# Patient Record
Sex: FEMALE | Race: WHITE | Employment: FULL TIME | URBAN - METROPOLITAN AREA
[De-identification: names, ages, dates, MRNs, and addresses within clinical notes are randomized per-mention and may not be internally consistent; named-entity substitution may affect disease eponyms.]

---

## 2017-01-10 ENCOUNTER — APPOINTMENT (OUTPATIENT)
Dept: LAB | Age: 47
End: 2017-01-10
Attending: FAMILY MEDICINE
Payer: COMMERCIAL

## 2017-01-10 DIAGNOSIS — R73.09 ELEVATED GLUCOSE: ICD-10-CM

## 2017-01-10 LAB — GLUCOSE SERPL-MCNC: 135 MG/DL (ref 70–99)

## 2017-01-10 PROCEDURE — 82947 ASSAY GLUCOSE BLOOD QUANT: CPT

## 2017-01-10 PROCEDURE — 36415 COLL VENOUS BLD VENIPUNCTURE: CPT

## 2017-01-14 ENCOUNTER — OFFICE VISIT (OUTPATIENT)
Dept: OBGYN CLINIC | Facility: CLINIC | Age: 47
End: 2017-01-14

## 2017-01-14 VITALS — SYSTOLIC BLOOD PRESSURE: 111 MMHG | DIASTOLIC BLOOD PRESSURE: 77 MMHG

## 2017-01-14 DIAGNOSIS — N91.1 SECONDARY AMENORRHEA: ICD-10-CM

## 2017-01-14 DIAGNOSIS — R10.2 PELVIC PAIN: ICD-10-CM

## 2017-01-14 DIAGNOSIS — D25.9 UTERINE LEIOMYOMA, UNSPECIFIED LOCATION: Primary | ICD-10-CM

## 2017-01-14 LAB
CONTROL LINE PRESENT WITH A CLEAR BACKGROUND (YES/NO): YES YES/NO
KIT LOT #: NORMAL NUMERIC
PREGNANCY TEST, URINE: NEGATIVE

## 2017-01-14 PROCEDURE — 81025 URINE PREGNANCY TEST: CPT | Performed by: OBSTETRICS & GYNECOLOGY

## 2017-01-14 PROCEDURE — 99244 OFF/OP CNSLTJ NEW/EST MOD 40: CPT | Performed by: OBSTETRICS & GYNECOLOGY

## 2017-01-14 PROCEDURE — 96372 THER/PROPH/DIAG INJ SC/IM: CPT | Performed by: OBSTETRICS & GYNECOLOGY

## 2017-01-14 RX ORDER — AZELASTINE HYDROCHLORIDE 0.5 MG/ML
SOLUTION/ DROPS OPHTHALMIC
Refills: 3 | COMMUNITY
Start: 2017-01-01 | End: 2017-07-22

## 2017-01-14 NOTE — PROGRESS NOTES
HPI:   Horacio Ellison is a 42year old female who presents for a consult for hx of large fundal fibroid and pelvic pain, s/p lupron tx for 6 months. Pt stated her pelvic pain has completely resolved and she feels her abd is smaller now.   Pt also with No past surgical history on file.    Family History   Problem Relation Age of Onset   • Diabetes Mother    • Other [Other] [OTHER] Father      good health   • Cancer Neg    • High Blood Pressure Mother       Social History:     Smoking Status: Never Sm possibly 2 more months. Pt counseled extensively, in exam room/counseled/seen for approx 25-30 min. Well woman cousneling. . Self breast exam explained.  Health maintenancePt' s weight is There is no weight on file to calculate BMI., recommended low fat

## 2017-01-19 ENCOUNTER — TELEPHONE (OUTPATIENT)
Dept: FAMILY MEDICINE CLINIC | Facility: CLINIC | Age: 47
End: 2017-01-19

## 2017-01-19 DIAGNOSIS — R73.01 IMPAIRED FASTING BLOOD SUGAR: Primary | ICD-10-CM

## 2017-01-19 NOTE — TELEPHONE ENCOUNTER
Notes Recorded by Diana Stanley DO on 1/16/2017 at 8:24 PM  Repeat blood sugar was much higher.  Have her do hemoglobin A1c diagnosis impaired fasting blood sugar and follow-up for an appointment next week.  Begin diabetic diet.  And 2 which can for

## 2017-02-22 ENCOUNTER — HOSPITAL ENCOUNTER (OUTPATIENT)
Dept: ULTRASOUND IMAGING | Facility: HOSPITAL | Age: 47
Discharge: HOME | End: 2017-02-22
Attending: OBSTETRICS & GYNECOLOGY
Payer: COMMERCIAL

## 2017-02-22 DIAGNOSIS — D25.9 UTERINE LEIOMYOMA, UNSPECIFIED LOCATION: ICD-10-CM

## 2017-02-22 PROCEDURE — 76830 TRANSVAGINAL US NON-OB: CPT

## 2017-02-22 PROCEDURE — 76856 US EXAM PELVIC COMPLETE: CPT

## 2017-02-23 NOTE — TELEPHONE ENCOUNTER
Noted. Please send letter to patient  Give her her blood tests (glucose ) from January and make sure she has an order for the additional blood tests ordered. She may f/u with me 1 day after her blood tests for the results and tx.

## 2017-02-23 NOTE — TELEPHONE ENCOUNTER
Spoke to pt's , he is requesting lab results from January. Advised him that he is not listed to release sensitive health care information. He became upset, states that he has the right to get information about his wife.  Advised him to call back when

## 2017-03-09 ENCOUNTER — LAB ENCOUNTER (OUTPATIENT)
Dept: LAB | Age: 47
End: 2017-03-09
Attending: FAMILY MEDICINE
Payer: COMMERCIAL

## 2017-03-09 DIAGNOSIS — R73.01 IMPAIRED FASTING GLUCOSE: Primary | ICD-10-CM

## 2017-03-09 LAB — HBA1C MFR BLD: 6.7 % (ref 4–6)

## 2017-03-09 PROCEDURE — 36415 COLL VENOUS BLD VENIPUNCTURE: CPT

## 2017-03-09 PROCEDURE — 83036 HEMOGLOBIN GLYCOSYLATED A1C: CPT

## 2017-03-17 ENCOUNTER — TELEPHONE (OUTPATIENT)
Dept: FAMILY MEDICINE CLINIC | Facility: CLINIC | Age: 47
End: 2017-03-17

## 2017-03-17 NOTE — TELEPHONE ENCOUNTER
----- Message from Melissa Ortega DO sent at 3/16/2017  5:59 PM CDT -----  Blood sugar was high .   follow-up to discuss please have her come in fasting

## 2017-03-18 ENCOUNTER — HOSPITAL ENCOUNTER (OUTPATIENT)
Dept: MAMMOGRAPHY | Age: 47
Discharge: HOME | End: 2017-03-18
Attending: FAMILY MEDICINE
Payer: COMMERCIAL

## 2017-03-18 DIAGNOSIS — Z12.31 VISIT FOR SCREENING MAMMOGRAM: ICD-10-CM

## 2017-03-18 PROCEDURE — 77067 SCR MAMMO BI INCL CAD: CPT

## 2017-04-01 ENCOUNTER — OFFICE VISIT (OUTPATIENT)
Dept: FAMILY MEDICINE CLINIC | Facility: CLINIC | Age: 47
End: 2017-04-01

## 2017-04-01 VITALS
WEIGHT: 150.19 LBS | TEMPERATURE: 98 F | SYSTOLIC BLOOD PRESSURE: 114 MMHG | DIASTOLIC BLOOD PRESSURE: 80 MMHG | BODY MASS INDEX: 27 KG/M2 | HEART RATE: 70 BPM

## 2017-04-01 DIAGNOSIS — E11.9 DIABETES MELLITUS, NEW ONSET (HCC): Primary | ICD-10-CM

## 2017-04-01 PROCEDURE — 99212 OFFICE O/P EST SF 10 MIN: CPT | Performed by: FAMILY MEDICINE

## 2017-04-01 PROCEDURE — 99214 OFFICE O/P EST MOD 30 MIN: CPT | Performed by: FAMILY MEDICINE

## 2017-04-01 RX ORDER — METFORMIN HYDROCHLORIDE 500 MG/1
500 TABLET, EXTENDED RELEASE ORAL
Qty: 90 TABLET | Refills: 3 | Status: SHIPPED | OUTPATIENT
Start: 2017-04-01 | End: 2018-03-05

## 2017-04-01 RX ORDER — ASPIRIN 81 MG/1
81 TABLET ORAL DAILY
Qty: 90 TABLET | Refills: 3 | Status: SHIPPED | OUTPATIENT
Start: 2017-04-01 | End: 2018-02-02

## 2017-04-01 NOTE — PROGRESS NOTES
New onset dm  Patient's hemoglobin A1c was 6.7 last fasting blood sugar was 135.   Patient is new onset diabetes mellitus  No exam performed today      Assessment diabetes mellitus new onset  We discussed the pathogenesis of disease we discussed and organ d

## 2017-04-20 RX ORDER — BLOOD SUGAR DIAGNOSTIC
STRIP MISCELLANEOUS
Qty: 50 STRIP | Refills: 3 | Status: SHIPPED | OUTPATIENT
Start: 2017-04-20 | End: 2017-04-29

## 2017-04-24 ENCOUNTER — APPOINTMENT (OUTPATIENT)
Dept: LAB | Age: 47
End: 2017-04-24
Attending: FAMILY MEDICINE
Payer: COMMERCIAL

## 2017-04-24 DIAGNOSIS — E11.9 DIABETES MELLITUS, NEW ONSET (HCC): ICD-10-CM

## 2017-04-24 PROCEDURE — 80053 COMPREHEN METABOLIC PANEL: CPT

## 2017-04-24 PROCEDURE — 36415 COLL VENOUS BLD VENIPUNCTURE: CPT

## 2017-04-24 PROCEDURE — 82043 UR ALBUMIN QUANTITATIVE: CPT

## 2017-04-24 PROCEDURE — 82570 ASSAY OF URINE CREATININE: CPT

## 2017-04-24 PROCEDURE — 80061 LIPID PANEL: CPT

## 2017-04-29 ENCOUNTER — OFFICE VISIT (OUTPATIENT)
Dept: FAMILY MEDICINE CLINIC | Facility: CLINIC | Age: 47
End: 2017-04-29

## 2017-04-29 VITALS
HEART RATE: 67 BPM | SYSTOLIC BLOOD PRESSURE: 104 MMHG | BODY MASS INDEX: 25.89 KG/M2 | HEIGHT: 64 IN | DIASTOLIC BLOOD PRESSURE: 65 MMHG | WEIGHT: 151.63 LBS

## 2017-04-29 DIAGNOSIS — E11.9 TYPE 2 DIABETES MELLITUS WITHOUT COMPLICATION, WITHOUT LONG-TERM CURRENT USE OF INSULIN (HCC): Primary | ICD-10-CM

## 2017-04-29 PROCEDURE — 99212 OFFICE O/P EST SF 10 MIN: CPT | Performed by: FAMILY MEDICINE

## 2017-04-29 PROCEDURE — 99214 OFFICE O/P EST MOD 30 MIN: CPT | Performed by: FAMILY MEDICINE

## 2017-04-29 RX ORDER — ROSUVASTATIN CALCIUM 5 MG/1
5 TABLET, COATED ORAL NIGHTLY
Qty: 90 TABLET | Refills: 3 | Status: SHIPPED | OUTPATIENT
Start: 2017-04-29 | End: 2018-04-01

## 2017-04-29 RX ORDER — LISINOPRIL 5 MG/1
5 TABLET ORAL DAILY
Qty: 90 TABLET | Refills: 3 | Status: SHIPPED | OUTPATIENT
Start: 2017-04-29 | End: 2018-04-01

## 2017-04-29 NOTE — PROGRESS NOTES
Has been checking her sugars  [address] was 125  [address] was 120  Usually 100 0r 105 in am.    No hypoglycemic episodes    Has been watching classes on youtube. Diabetic Visit    Patient denies problems with their medication.   Denies ulcers, chest pa

## 2017-06-29 ENCOUNTER — HOSPITAL ENCOUNTER (OUTPATIENT)
Dept: ENDOCRINOLOGY | Facility: HOSPITAL | Age: 47
Discharge: HOME | End: 2017-06-29
Attending: FAMILY MEDICINE
Payer: COMMERCIAL

## 2017-06-29 VITALS — WEIGHT: 151.19 LBS | BODY MASS INDEX: 26 KG/M2

## 2017-06-29 DIAGNOSIS — E11.9 TYPE 2 DIABETES MELLITUS WITHOUT COMPLICATION, WITHOUT LONG-TERM CURRENT USE OF INSULIN (HCC): Primary | ICD-10-CM

## 2017-06-29 NOTE — PROGRESS NOTES
[address]  Visit Note     Ashleysarthak Cadenaoctavio   :3/6/1970      Patient attended diabetes education appointment. Education provided in [address] [place] [address].      Assessment:    Blood sugars and monitoring: checking 2-3 times

## 2017-07-15 ENCOUNTER — APPOINTMENT (OUTPATIENT)
Dept: LAB | Age: 47
End: 2017-07-15
Attending: FAMILY MEDICINE
Payer: COMMERCIAL

## 2017-07-15 DIAGNOSIS — E11.9 TYPE 2 DIABETES MELLITUS WITHOUT COMPLICATION, WITHOUT LONG-TERM CURRENT USE OF INSULIN (HCC): ICD-10-CM

## 2017-07-15 LAB
ALBUMIN SERPL BCP-MCNC: 4 G/DL (ref 3.5–4.8)
ALBUMIN/GLOB SERPL: 1.5 {RATIO} (ref 1–2)
ALP SERPL-CCNC: 62 U/L (ref 32–100)
ALT SERPL-CCNC: 18 U/L (ref 14–54)
ANION GAP SERPL CALC-SCNC: 8 MMOL/L (ref 0–18)
AST SERPL-CCNC: 17 U/L (ref 15–41)
BILIRUB SERPL-MCNC: 1.1 MG/DL (ref 0.3–1.2)
BUN SERPL-MCNC: 13 MG/DL (ref 8–20)
BUN/CREAT SERPL: 18.6 (ref 10–20)
CALCIUM SERPL-MCNC: 9 MG/DL (ref 8.5–10.5)
CHLORIDE SERPL-SCNC: 104 MMOL/L (ref 95–110)
CHOLEST SERPL-MCNC: 126 MG/DL (ref 110–200)
CO2 SERPL-SCNC: 25 MMOL/L (ref 22–32)
CREAT SERPL-MCNC: 0.7 MG/DL (ref 0.5–1.5)
CREAT UR-MCNC: 88.2 MG/DL
GLOBULIN PLAS-MCNC: 2.7 G/DL (ref 2.5–3.7)
GLUCOSE SERPL-MCNC: 101 MG/DL (ref 70–99)
HDLC SERPL-MCNC: 29 MG/DL
LDLC SERPL CALC-MCNC: 51 MG/DL (ref 0–99)
MICROALBUMIN UR-MCNC: 0.3 MG/DL (ref 0–1.8)
MICROALBUMIN/CREAT UR: 3.4 MG/G{CREAT} (ref 0–20)
NONHDLC SERPL-MCNC: 97 MG/DL
OSMOLALITY UR CALC.SUM OF ELEC: 284 MOSM/KG (ref 275–295)
POTASSIUM SERPL-SCNC: 4 MMOL/L (ref 3.3–5.1)
PROT SERPL-MCNC: 6.7 G/DL (ref 5.9–8.4)
SODIUM SERPL-SCNC: 137 MMOL/L (ref 136–144)
TRIGL SERPL-MCNC: 228 MG/DL (ref 1–149)

## 2017-07-15 PROCEDURE — 36415 COLL VENOUS BLD VENIPUNCTURE: CPT

## 2017-07-15 PROCEDURE — 80053 COMPREHEN METABOLIC PANEL: CPT

## 2017-07-15 PROCEDURE — 82043 UR ALBUMIN QUANTITATIVE: CPT

## 2017-07-15 PROCEDURE — 80061 LIPID PANEL: CPT

## 2017-07-15 PROCEDURE — 82570 ASSAY OF URINE CREATININE: CPT

## 2017-07-15 PROCEDURE — 83036 HEMOGLOBIN GLYCOSYLATED A1C: CPT

## 2017-07-16 LAB — HBA1C MFR BLD: 6.7 % (ref 4–6)

## 2017-07-20 ENCOUNTER — HOSPITAL ENCOUNTER (OUTPATIENT)
Dept: ENDOCRINOLOGY | Facility: HOSPITAL | Age: 47
Discharge: HOME | End: 2017-07-20
Attending: FAMILY MEDICINE
Payer: COMMERCIAL

## 2017-07-20 DIAGNOSIS — E11.9 TYPE 2 DIABETES MELLITUS WITHOUT COMPLICATION, WITHOUT LONG-TERM CURRENT USE OF INSULIN (HCC): Primary | ICD-10-CM

## 2017-07-20 NOTE — PROGRESS NOTES
[address]  Visit Note     [place]   :3/6/1970    Time started: 8:25 AM   Time ended:9:25      Patient attended follow-up diabetes education appointment. Education provided in [address] [place] [address]

## 2017-07-22 ENCOUNTER — OFFICE VISIT (OUTPATIENT)
Dept: FAMILY MEDICINE CLINIC | Facility: CLINIC | Age: 47
End: 2017-07-22

## 2017-07-22 VITALS
BODY MASS INDEX: 26 KG/M2 | TEMPERATURE: 98 F | WEIGHT: 150 LBS | HEART RATE: 65 BPM | SYSTOLIC BLOOD PRESSURE: 100 MMHG | DIASTOLIC BLOOD PRESSURE: 67 MMHG

## 2017-07-22 DIAGNOSIS — E11.9 TYPE 2 DIABETES MELLITUS WITHOUT COMPLICATION, WITHOUT LONG-TERM CURRENT USE OF INSULIN (HCC): Primary | ICD-10-CM

## 2017-07-22 PROCEDURE — 99214 OFFICE O/P EST MOD 30 MIN: CPT | Performed by: FAMILY MEDICINE

## 2017-07-22 PROCEDURE — 90471 IMMUNIZATION ADMIN: CPT | Performed by: FAMILY MEDICINE

## 2017-07-22 PROCEDURE — 90670 PCV13 VACCINE IM: CPT | Performed by: FAMILY MEDICINE

## 2017-07-22 PROCEDURE — 99212 OFFICE O/P EST SF 10 MIN: CPT | Performed by: FAMILY MEDICINE

## 2017-07-22 NOTE — PROGRESS NOTES
Diabetic Visit    Patient denies problems with their medication. Denies ulcers, chest pain, dyspnea on exertion. Ht rrr no M no S3 S4  Lung clear no wheeze  abd soft nontender  Carotids without bruit  Ext normal monofilament exam  No ulcers on feet.

## 2017-08-10 ENCOUNTER — APPOINTMENT (OUTPATIENT)
Dept: ENDOCRINOLOGY | Facility: HOSPITAL | Age: 47
End: 2017-08-10
Attending: FAMILY MEDICINE
Payer: COMMERCIAL

## 2017-10-26 RX ORDER — BLOOD SUGAR DIAGNOSTIC
STRIP MISCELLANEOUS
Qty: 100 STRIP | Refills: 3 | Status: SHIPPED | OUTPATIENT
Start: 2017-10-26 | End: 2019-08-04

## 2017-11-03 RX ORDER — LANCETS
EACH MISCELLANEOUS
Qty: 200 EACH | Refills: 3 | Status: SHIPPED | OUTPATIENT
Start: 2017-11-03 | End: 2019-08-23

## 2018-01-23 ENCOUNTER — OFFICE VISIT (OUTPATIENT)
Dept: FAMILY MEDICINE CLINIC | Facility: CLINIC | Age: 48
End: 2018-01-23

## 2018-01-23 VITALS
WEIGHT: 153 LBS | HEART RATE: 79 BPM | DIASTOLIC BLOOD PRESSURE: 68 MMHG | TEMPERATURE: 98 F | BODY MASS INDEX: 26 KG/M2 | SYSTOLIC BLOOD PRESSURE: 106 MMHG

## 2018-01-23 DIAGNOSIS — E11.9 TYPE 2 DIABETES MELLITUS WITHOUT COMPLICATION, WITHOUT LONG-TERM CURRENT USE OF INSULIN (HCC): Primary | ICD-10-CM

## 2018-01-23 DIAGNOSIS — D21.9 LEIOMYOMA: ICD-10-CM

## 2018-01-23 DIAGNOSIS — N94.6 PAINFUL MENSTRUATION: ICD-10-CM

## 2018-01-23 PROCEDURE — 99212 OFFICE O/P EST SF 10 MIN: CPT | Performed by: FAMILY MEDICINE

## 2018-01-23 PROCEDURE — 99214 OFFICE O/P EST MOD 30 MIN: CPT | Performed by: FAMILY MEDICINE

## 2018-01-23 NOTE — PROGRESS NOTES
Has painful periods  Didn't get any better with the Treatment. leiomyoma was large 1 year ago   Refer to Dr Bernadine Roca uses her medication  Good control    Patient's past medical surgical family social history was reviewed.     Review of Systems (TRANSABDOMINAL AND TRANSVAGINAL) (CPT=[id number]);  Future

## 2018-01-29 ENCOUNTER — APPOINTMENT (OUTPATIENT)
Dept: LAB | Age: 48
End: 2018-01-29
Attending: FAMILY MEDICINE
Payer: COMMERCIAL

## 2018-01-29 DIAGNOSIS — E11.9 TYPE 2 DIABETES MELLITUS WITHOUT COMPLICATION, WITHOUT LONG-TERM CURRENT USE OF INSULIN (HCC): ICD-10-CM

## 2018-01-29 LAB
ALBUMIN SERPL BCP-MCNC: 3.9 G/DL (ref 3.5–4.8)
ALBUMIN/GLOB SERPL: 1.3 {RATIO} (ref 1–2)
ALP SERPL-CCNC: 71 U/L (ref 32–100)
ALT SERPL-CCNC: 20 U/L (ref 14–54)
ANION GAP SERPL CALC-SCNC: 12 MMOL/L (ref 0–18)
AST SERPL-CCNC: 20 U/L (ref 15–41)
BILIRUB SERPL-MCNC: 1.8 MG/DL (ref 0.3–1.2)
BUN SERPL-MCNC: 10 MG/DL (ref 8–20)
BUN/CREAT SERPL: 12.8 (ref 10–20)
CALCIUM SERPL-MCNC: 9.1 MG/DL (ref 8.5–10.5)
CHLORIDE SERPL-SCNC: 101 MMOL/L (ref 95–110)
CHOLEST SERPL-MCNC: 121 MG/DL (ref 110–200)
CO2 SERPL-SCNC: 24 MMOL/L (ref 22–32)
CREAT SERPL-MCNC: 0.78 MG/DL (ref 0.5–1.5)
CREAT UR-MCNC: 144.6 MG/DL
GLOBULIN PLAS-MCNC: 3.1 G/DL (ref 2.5–3.7)
GLUCOSE SERPL-MCNC: 119 MG/DL (ref 70–99)
HBA1C MFR BLD: 7 % (ref 4–6)
HDLC SERPL-MCNC: 37 MG/DL
LDLC SERPL CALC-MCNC: 63 MG/DL (ref 0–99)
MICROALBUMIN UR-MCNC: 0.6 MG/DL (ref 0–1.8)
MICROALBUMIN/CREAT UR: 4.1 MG/G{CREAT} (ref 0–20)
NONHDLC SERPL-MCNC: 84 MG/DL
OSMOLALITY UR CALC.SUM OF ELEC: 284 MOSM/KG (ref 275–295)
POTASSIUM SERPL-SCNC: 3.8 MMOL/L (ref 3.3–5.1)
PROT SERPL-MCNC: 7 G/DL (ref 5.9–8.4)
SODIUM SERPL-SCNC: 137 MMOL/L (ref 136–144)
TRIGL SERPL-MCNC: 105 MG/DL (ref 1–149)

## 2018-01-29 PROCEDURE — 82570 ASSAY OF URINE CREATININE: CPT

## 2018-01-29 PROCEDURE — 36415 COLL VENOUS BLD VENIPUNCTURE: CPT

## 2018-01-29 PROCEDURE — 80053 COMPREHEN METABOLIC PANEL: CPT

## 2018-01-29 PROCEDURE — 82043 UR ALBUMIN QUANTITATIVE: CPT

## 2018-01-29 PROCEDURE — 83036 HEMOGLOBIN GLYCOSYLATED A1C: CPT

## 2018-01-29 PROCEDURE — 80061 LIPID PANEL: CPT

## 2018-02-02 ENCOUNTER — TELEPHONE (OUTPATIENT)
Dept: FAMILY MEDICINE CLINIC | Facility: CLINIC | Age: 48
End: 2018-02-02

## 2018-02-02 DIAGNOSIS — E11.9 TYPE 2 DIABETES MELLITUS WITHOUT COMPLICATION, WITHOUT LONG-TERM CURRENT USE OF INSULIN (HCC): Primary | ICD-10-CM

## 2018-02-02 RX ORDER — ASPIRIN 81 MG/1
TABLET ORAL
Qty: 90 TABLET | Refills: 0 | Status: SHIPPED | OUTPATIENT
Start: 2018-02-02 | End: 2018-05-04

## 2018-02-02 NOTE — TELEPHONE ENCOUNTER
Chart reviewed. Refills sent per Triage Dept protocol.      Refill Protocol Appointment Criteria  · Appointment scheduled in the past 6 months or in the next 3 months  Recent Outpatient Visits            1 week ago Type 2 diabetes mellitus without complicat

## 2018-02-02 NOTE — TELEPHONE ENCOUNTER
----- Message from Eunice Kapoor DO sent at 2/2/2018  7:41 AM CST -----  Labs good  Continue the meds  Recheck 6 mo same labs same dx.

## 2018-02-13 ENCOUNTER — HOSPITAL ENCOUNTER (OUTPATIENT)
Dept: ULTRASOUND IMAGING | Facility: HOSPITAL | Age: 48
Discharge: HOME | End: 2018-02-13
Attending: FAMILY MEDICINE
Payer: COMMERCIAL

## 2018-02-13 DIAGNOSIS — N94.6 PAINFUL MENSTRUATION: ICD-10-CM

## 2018-02-13 DIAGNOSIS — D21.9 LEIOMYOMA: ICD-10-CM

## 2018-02-13 PROCEDURE — 76830 TRANSVAGINAL US NON-OB: CPT | Performed by: FAMILY MEDICINE

## 2018-02-13 PROCEDURE — 76856 US EXAM PELVIC COMPLETE: CPT | Performed by: FAMILY MEDICINE

## 2018-02-28 ENCOUNTER — OFFICE VISIT (OUTPATIENT)
Dept: OBGYN CLINIC | Facility: CLINIC | Age: 48
End: 2018-02-28

## 2018-02-28 ENCOUNTER — TELEPHONE (OUTPATIENT)
Dept: OBGYN CLINIC | Facility: CLINIC | Age: 48
End: 2018-02-28

## 2018-02-28 VITALS — DIASTOLIC BLOOD PRESSURE: 64 MMHG | BODY MASS INDEX: 26 KG/M2 | WEIGHT: 150 LBS | SYSTOLIC BLOOD PRESSURE: 100 MMHG

## 2018-02-28 DIAGNOSIS — N84.0 ENDOMETRIAL POLYP: ICD-10-CM

## 2018-02-28 DIAGNOSIS — R10.2 PELVIC PAIN IN FEMALE: ICD-10-CM

## 2018-02-28 DIAGNOSIS — D25.9 UTERINE LEIOMYOMA, UNSPECIFIED LOCATION: Primary | ICD-10-CM

## 2018-02-28 DIAGNOSIS — N94.6 SEVERE DYSMENORRHEA: ICD-10-CM

## 2018-02-28 DIAGNOSIS — N94.6 DYSMENORRHEA: ICD-10-CM

## 2018-02-28 PROCEDURE — 99215 OFFICE O/P EST HI 40 MIN: CPT | Performed by: OBSTETRICS & GYNECOLOGY

## 2018-02-28 NOTE — PROGRESS NOTES
HPI:   Frank Anne is a 41year old female who presents for a consult for enlarged fibroid uterus/symptomatic fibroid uterus.    Pt and  extensively counseled on recent emh u/s 2/13/18 showing an 18 cm uterus, with 16 mm possible polyp endocx Vitamins-Minerals (WOMENS MULTIVITAMIN PLUS) Oral Tab Take 1 tablet by mouth daily.  Disp:  Rfl:    MetFORMIN HCl  MG Oral Tablet 24 Hr Take 1 tablet (500 mg total) by mouth daily with breakfast. Disp: 90 tablet Rfl: 3      Past Medical History:   [place] tender,FROM of the back  EXTREMITIES: no cyanosis, clubbing or edema  NEURO: Oriented times three,    ASSESSMENT AND PLAN:   Shakira Vázquez is a 41year old female who presents for a consult for sx enlarged fibroid uterus/endomet and endocx polyp,  [place]

## 2018-02-28 NOTE — TELEPHONE ENCOUNTER
Dr. Antoine Must  what is her diagnosis for this? ENOCH, BSO ? Dr. Kyra Mahmood for the assist or son?

## 2018-03-05 RX ORDER — METFORMIN HYDROCHLORIDE 500 MG/1
TABLET, EXTENDED RELEASE ORAL
Qty: 30 TABLET | Refills: 2 | Status: SHIPPED | OUTPATIENT
Start: 2018-03-05 | End: 2018-05-29

## 2018-03-05 NOTE — TELEPHONE ENCOUNTER
Spoke to Faith, and the following days are open for [place] and the OR;  Fri: 3/30 @ 7:30  Fri: 4/13 @ 7:30  Both days you have office hours, since both days are fridays.  Please advice if we need to make adjustments to your schedule to coordinate with

## 2018-03-05 NOTE — TELEPHONE ENCOUNTER
Diabetes Medications  Protocol Criteria:  · Appointment scheduled in the past 6 months or the next 3 months  · A1C < 7.5 in the past 6 months  · Creatinine in the past 12 months  · Creatinine result < 1.5   Recent Outpatient Visits            5 days ago Ut

## 2018-03-07 NOTE — TELEPHONE ENCOUNTER
Pt is scheduled for 3/30 @ 7:30 am.  LVM to Precious Steinberg to confirm w/ Dr. Leonid Edgar. Entered in book. Pre-cert pending  Will send Christianne Anne and Denise Garcia an email after confirming with Emily's office and pt.

## 2018-03-12 NOTE — TELEPHONE ENCOUNTER
Emily agreed to assist. Mary Marie requested notes to be faxed to [facility] [place]. Faxed off note and U/S. Called pt to inform of sx details. Not able to reach pt.     Needs to cristino appt with ASJ before sx

## 2018-03-13 NOTE — TELEPHONE ENCOUNTER
Spoke to pt and scheduled her EMBX next Friday 3/23. Informed her of sx details. She Understood and verbalized agreement.

## 2018-03-21 NOTE — TELEPHONE ENCOUNTER
Called [facility] care for auth status. Taylor Henson called in clinicals. Pt authorized for 2 day stay. A# same as reference number. Any additional days, clinicals need to be faxed. Pt has pre-op/embx appt 3/23.

## 2018-03-23 ENCOUNTER — OFFICE VISIT (OUTPATIENT)
Dept: OBGYN CLINIC | Facility: CLINIC | Age: 48
End: 2018-03-23

## 2018-03-23 VITALS
DIASTOLIC BLOOD PRESSURE: 76 MMHG | BODY MASS INDEX: 26.93 KG/M2 | SYSTOLIC BLOOD PRESSURE: 115 MMHG | WEIGHT: 152 LBS | HEART RATE: 76 BPM | HEIGHT: 63 IN

## 2018-03-23 DIAGNOSIS — N94.6 DYSMENORRHEA: ICD-10-CM

## 2018-03-23 DIAGNOSIS — D25.9 UTERINE LEIOMYOMA, UNSPECIFIED LOCATION: Primary | ICD-10-CM

## 2018-03-23 DIAGNOSIS — N92.0 EXCESSIVE OR FREQUENT MENSTRUATION: ICD-10-CM

## 2018-03-23 DIAGNOSIS — Z01.812 PRE-PROCEDURAL LABORATORY EXAMINATION: ICD-10-CM

## 2018-03-23 PROCEDURE — 81025 URINE PREGNANCY TEST: CPT | Performed by: OBSTETRICS & GYNECOLOGY

## 2018-03-23 PROCEDURE — 58100 BIOPSY OF UTERUS LINING: CPT | Performed by: OBSTETRICS & GYNECOLOGY

## 2018-03-23 PROCEDURE — 99214 OFFICE O/P EST MOD 30 MIN: CPT | Performed by: OBSTETRICS & GYNECOLOGY

## 2018-03-23 NOTE — PROGRESS NOTES
HPI:   Damien Alston is a 57year old female who presents for a hx of symptomatic fibroid uterus, pt requested christiano next week, requested endometrial biopsy today,  All questions answered.       Wt Readings from Last 6 Encounters:  03/23/18 : 152 lb (68 Disp:  Rfl:       Past Medical History:   Diagnosis Date   • Lipid screening 10/19/2013    per NG   • Type 2 diabetes mellitus without complication, without long-term current use of insulin ([facility]) 6/29/2017   • Uterine fibroid 2016    lupron injections. done, pt tolerated very well. Single tooth tenaculum removed. Good hemostasis noted.      MUSCULOSKELETAL: back is not tender,FROM of the back  EXTREMITIES: no cyanosis, clubbing or edema  NEURO: Oriented times three,    ASSESSMENT AND PLAN:   Bert Peña

## 2018-03-27 ENCOUNTER — LAB ENCOUNTER (OUTPATIENT)
Dept: LAB | Age: 48
End: 2018-03-27
Attending: OBSTETRICS & GYNECOLOGY
Payer: COMMERCIAL

## 2018-03-27 ENCOUNTER — APPOINTMENT (OUTPATIENT)
Dept: LAB | Age: 48
End: 2018-03-27
Attending: OBSTETRICS & GYNECOLOGY
Payer: COMMERCIAL

## 2018-03-27 DIAGNOSIS — D25.9 UTERINE LEIOMYOMA, UNSPECIFIED LOCATION: ICD-10-CM

## 2018-03-27 LAB
ANTIBODY SCREEN: NEGATIVE
BASOPHILS # BLD: 0 K/UL (ref 0–0.2)
BASOPHILS NFR BLD: 0 %
EOSINOPHIL # BLD: 0.1 K/UL (ref 0–0.7)
EOSINOPHIL NFR BLD: 1 %
ERYTHROCYTE [DISTWIDTH] IN BLOOD BY AUTOMATED COUNT: 13.3 % (ref 11–15)
HCT VFR BLD AUTO: 35.2 % (ref 35–48)
HGB BLD-MCNC: 12 G/DL (ref 12–16)
LYMPHOCYTES # BLD: 1.9 K/UL (ref 1–4)
LYMPHOCYTES NFR BLD: 17 %
MCH RBC QN AUTO: 29.4 PG (ref 27–32)
MCHC RBC AUTO-ENTMCNC: 34.2 G/DL (ref 32–37)
MCV RBC AUTO: 86.1 FL (ref 80–100)
MONOCYTES # BLD: 0.7 K/UL (ref 0–1)
MONOCYTES NFR BLD: 7 %
NEUTROPHILS # BLD AUTO: 8.3 K/UL (ref 1.8–7.7)
NEUTROPHILS NFR BLD: 75 %
PLATELET # BLD AUTO: 315 K/UL (ref 140–400)
PMV BLD AUTO: 8.6 FL (ref 7.4–10.3)
RBC # BLD AUTO: 4.09 M/UL (ref 3.7–5.4)
RH BLOOD TYPE: POSITIVE
WBC # BLD AUTO: 11 K/UL (ref 4–11)

## 2018-03-27 PROCEDURE — 86901 BLOOD TYPING SEROLOGIC RH(D): CPT

## 2018-03-27 PROCEDURE — 85025 COMPLETE CBC W/AUTO DIFF WBC: CPT

## 2018-03-27 PROCEDURE — 93005 ELECTROCARDIOGRAM TRACING: CPT

## 2018-03-27 PROCEDURE — 93010 ELECTROCARDIOGRAM REPORT: CPT | Performed by: OBSTETRICS & GYNECOLOGY

## 2018-03-27 PROCEDURE — 86850 RBC ANTIBODY SCREEN: CPT

## 2018-03-27 PROCEDURE — 36415 COLL VENOUS BLD VENIPUNCTURE: CPT

## 2018-03-27 PROCEDURE — 86900 BLOOD TYPING SEROLOGIC ABO: CPT

## 2018-03-30 ENCOUNTER — ANESTHESIA EVENT (OUTPATIENT)
Dept: SURGERY | Facility: HOSPITAL | Age: 48
DRG: 743 | End: 2018-03-30
Payer: COMMERCIAL

## 2018-03-30 ENCOUNTER — HOSPITAL ENCOUNTER (INPATIENT)
Facility: HOSPITAL | Age: 48
LOS: 2 days | Discharge: HOME | DRG: 743 | End: 2018-04-01
Attending: OBSTETRICS & GYNECOLOGY | Admitting: OBSTETRICS & GYNECOLOGY
Payer: COMMERCIAL

## 2018-03-30 ENCOUNTER — ANESTHESIA (OUTPATIENT)
Dept: SURGERY | Facility: HOSPITAL | Age: 48
DRG: 743 | End: 2018-03-30
Payer: COMMERCIAL

## 2018-03-30 ENCOUNTER — SURGERY (OUTPATIENT)
Age: 48
End: 2018-03-30

## 2018-03-30 DIAGNOSIS — D25.9 UTERINE LEIOMYOMA, UNSPECIFIED LOCATION: Primary | ICD-10-CM

## 2018-03-30 DIAGNOSIS — R10.2 PELVIC PAIN IN FEMALE: ICD-10-CM

## 2018-03-30 DIAGNOSIS — N94.6 SEVERE DYSMENORRHEA: ICD-10-CM

## 2018-03-30 PROCEDURE — 58150 TOTAL HYSTERECTOMY: CPT | Performed by: OBSTETRICS & GYNECOLOGY

## 2018-03-30 PROCEDURE — 0UT00ZZ RESECTION OF RIGHT OVARY, OPEN APPROACH: ICD-10-PCS | Performed by: OBSTETRICS & GYNECOLOGY

## 2018-03-30 PROCEDURE — 0UT90ZZ RESECTION OF UTERUS, OPEN APPROACH: ICD-10-PCS | Performed by: OBSTETRICS & GYNECOLOGY

## 2018-03-30 PROCEDURE — 0UT70ZZ RESECTION OF BILATERAL FALLOPIAN TUBES, OPEN APPROACH: ICD-10-PCS | Performed by: OBSTETRICS & GYNECOLOGY

## 2018-03-30 RX ORDER — BUPIVACAINE HYDROCHLORIDE AND EPINEPHRINE 2.5; 5 MG/ML; UG/ML
INJECTION, SOLUTION INFILTRATION; PERINEURAL AS NEEDED
Status: DISCONTINUED | OUTPATIENT
Start: 2018-03-30 | End: 2018-03-30 | Stop reason: HOSPADM

## 2018-03-30 RX ORDER — HALOPERIDOL 5 MG/ML
0.25 INJECTION INTRAMUSCULAR ONCE AS NEEDED
Status: DISCONTINUED | OUTPATIENT
Start: 2018-03-30 | End: 2018-03-30 | Stop reason: HOSPADM

## 2018-03-30 RX ORDER — CEFAZOLIN SODIUM/WATER 2 G/20 ML
2 SYRINGE (ML) INTRAVENOUS ONCE
Status: COMPLETED | OUTPATIENT
Start: 2018-03-30 | End: 2018-03-30

## 2018-03-30 RX ORDER — LIDOCAINE HYDROCHLORIDE 10 MG/ML
INJECTION, SOLUTION EPIDURAL; INFILTRATION; INTRACAUDAL; PERINEURAL AS NEEDED
Status: DISCONTINUED | OUTPATIENT
Start: 2018-03-30 | End: 2018-03-30 | Stop reason: SURG

## 2018-03-30 RX ORDER — SODIUM PHOSPHATE, DIBASIC AND SODIUM PHOSPHATE, MONOBASIC 7; 19 G/133ML; G/133ML
1 ENEMA RECTAL ONCE AS NEEDED
Status: DISCONTINUED | OUTPATIENT
Start: 2018-03-30 | End: 2018-04-01

## 2018-03-30 RX ORDER — HYDROCODONE BITARTRATE AND ACETAMINOPHEN 7.5; 325 MG/1; MG/1
1 TABLET ORAL EVERY 4 HOURS PRN
Status: DISCONTINUED | OUTPATIENT
Start: 2018-03-30 | End: 2018-04-01

## 2018-03-30 RX ORDER — INSULIN ASPART 100 [IU]/ML
2 INJECTION, SOLUTION INTRAVENOUS; SUBCUTANEOUS ONCE
Status: COMPLETED | OUTPATIENT
Start: 2018-03-30 | End: 2018-03-30

## 2018-03-30 RX ORDER — ONDANSETRON 4 MG/1
4 TABLET, FILM COATED ORAL EVERY 8 HOURS PRN
Status: DISCONTINUED | OUTPATIENT
Start: 2018-03-30 | End: 2018-04-01

## 2018-03-30 RX ORDER — SODIUM CHLORIDE, SODIUM LACTATE, POTASSIUM CHLORIDE, CALCIUM CHLORIDE 600; 310; 30; 20 MG/100ML; MG/100ML; MG/100ML; MG/100ML
INJECTION, SOLUTION INTRAVENOUS CONTINUOUS
Status: DISCONTINUED | OUTPATIENT
Start: 2018-03-30 | End: 2018-04-01

## 2018-03-30 RX ORDER — ACETAMINOPHEN 325 MG/1
650 TABLET ORAL EVERY 4 HOURS PRN
Status: DISCONTINUED | OUTPATIENT
Start: 2018-03-30 | End: 2018-04-01

## 2018-03-30 RX ORDER — ONDANSETRON 2 MG/ML
4 INJECTION INTRAMUSCULAR; INTRAVENOUS EVERY 6 HOURS PRN
Status: DISCONTINUED | OUTPATIENT
Start: 2018-03-30 | End: 2018-03-30 | Stop reason: ALTCHOICE

## 2018-03-30 RX ORDER — FAMOTIDINE 20 MG/1
20 TABLET ORAL ONCE
Status: DISCONTINUED | OUTPATIENT
Start: 2018-03-30 | End: 2018-03-30 | Stop reason: HOSPADM

## 2018-03-30 RX ORDER — ONDANSETRON 2 MG/ML
INJECTION INTRAMUSCULAR; INTRAVENOUS AS NEEDED
Status: DISCONTINUED | OUTPATIENT
Start: 2018-03-30 | End: 2018-03-30 | Stop reason: SURG

## 2018-03-30 RX ORDER — ONDANSETRON 2 MG/ML
4 INJECTION INTRAMUSCULAR; INTRAVENOUS ONCE AS NEEDED
Status: DISCONTINUED | OUTPATIENT
Start: 2018-03-30 | End: 2018-03-30 | Stop reason: HOSPADM

## 2018-03-30 RX ORDER — MORPHINE SULFATE 2 MG/ML
2 INJECTION, SOLUTION INTRAMUSCULAR; INTRAVENOUS EVERY 10 MIN PRN
Status: DISCONTINUED | OUTPATIENT
Start: 2018-03-30 | End: 2018-03-30 | Stop reason: HOSPADM

## 2018-03-30 RX ORDER — POLYETHYLENE GLYCOL 3350 17 G/17G
17 POWDER, FOR SOLUTION ORAL DAILY PRN
Status: DISCONTINUED | OUTPATIENT
Start: 2018-03-30 | End: 2018-04-01

## 2018-03-30 RX ORDER — DEXTROSE MONOHYDRATE 25 G/50ML
50 INJECTION, SOLUTION INTRAVENOUS AS NEEDED
Status: DISCONTINUED | OUTPATIENT
Start: 2018-03-30 | End: 2018-04-01

## 2018-03-30 RX ORDER — NALOXONE HYDROCHLORIDE 0.4 MG/ML
0.08 INJECTION, SOLUTION INTRAMUSCULAR; INTRAVENOUS; SUBCUTANEOUS
Status: DISCONTINUED | OUTPATIENT
Start: 2018-03-30 | End: 2018-04-01

## 2018-03-30 RX ORDER — HYDROMORPHONE HYDROCHLORIDE 1 MG/ML
0.2 INJECTION, SOLUTION INTRAMUSCULAR; INTRAVENOUS; SUBCUTANEOUS EVERY 5 MIN PRN
Status: DISCONTINUED | OUTPATIENT
Start: 2018-03-30 | End: 2018-03-30 | Stop reason: HOSPADM

## 2018-03-30 RX ORDER — MORPHINE SULFATE 4 MG/ML
4 INJECTION, SOLUTION INTRAMUSCULAR; INTRAVENOUS EVERY 10 MIN PRN
Status: DISCONTINUED | OUTPATIENT
Start: 2018-03-30 | End: 2018-03-30 | Stop reason: HOSPADM

## 2018-03-30 RX ORDER — SODIUM CHLORIDE, SODIUM LACTATE, POTASSIUM CHLORIDE, CALCIUM CHLORIDE 600; 310; 30; 20 MG/100ML; MG/100ML; MG/100ML; MG/100ML
INJECTION, SOLUTION INTRAVENOUS CONTINUOUS
Status: DISCONTINUED | OUTPATIENT
Start: 2018-03-30 | End: 2018-03-30 | Stop reason: HOSPADM

## 2018-03-30 RX ORDER — ONDANSETRON 2 MG/ML
4 INJECTION INTRAMUSCULAR; INTRAVENOUS EVERY 8 HOURS PRN
Status: DISCONTINUED | OUTPATIENT
Start: 2018-03-30 | End: 2018-04-01

## 2018-03-30 RX ORDER — DOCUSATE SODIUM 100 MG/1
100 CAPSULE, LIQUID FILLED ORAL 2 TIMES DAILY
Status: DISCONTINUED | OUTPATIENT
Start: 2018-03-30 | End: 2018-04-01

## 2018-03-30 RX ORDER — NALBUPHINE HCL 10 MG/ML
2.5 AMPUL (ML) INJECTION EVERY 4 HOURS PRN
Status: DISCONTINUED | OUTPATIENT
Start: 2018-03-30 | End: 2018-04-01

## 2018-03-30 RX ORDER — EPHEDRINE SULFATE 50 MG/ML
INJECTION, SOLUTION INTRAVENOUS AS NEEDED
Status: DISCONTINUED | OUTPATIENT
Start: 2018-03-30 | End: 2018-03-30 | Stop reason: SURG

## 2018-03-30 RX ORDER — MULTIPLE VITAMINS W/ MINERALS TAB 9MG-400MCG
1 TAB ORAL DAILY
Status: DISCONTINUED | OUTPATIENT
Start: 2018-03-30 | End: 2018-04-01

## 2018-03-30 RX ORDER — METOCLOPRAMIDE 10 MG/1
10 TABLET ORAL ONCE
Status: DISCONTINUED | OUTPATIENT
Start: 2018-03-30 | End: 2018-03-30 | Stop reason: HOSPADM

## 2018-03-30 RX ORDER — SODIUM CHLORIDE 0.9 % (FLUSH) 0.9 %
10 SYRINGE (ML) INJECTION AS NEEDED
Status: DISCONTINUED | OUTPATIENT
Start: 2018-03-30 | End: 2018-04-01

## 2018-03-30 RX ORDER — ZOLPIDEM TARTRATE 5 MG/1
5 TABLET ORAL NIGHTLY PRN
Status: DISCONTINUED | OUTPATIENT
Start: 2018-03-30 | End: 2018-04-01

## 2018-03-30 RX ORDER — HYDROCODONE BITARTRATE AND ACETAMINOPHEN 7.5; 325 MG/1; MG/1
2 TABLET ORAL EVERY 4 HOURS PRN
Status: DISCONTINUED | OUTPATIENT
Start: 2018-03-30 | End: 2018-04-01

## 2018-03-30 RX ORDER — ACETAMINOPHEN 500 MG
1000 TABLET ORAL ONCE
Status: COMPLETED | OUTPATIENT
Start: 2018-03-30 | End: 2018-03-30

## 2018-03-30 RX ORDER — LISINOPRIL 5 MG/1
5 TABLET ORAL DAILY
Status: DISCONTINUED | OUTPATIENT
Start: 2018-03-30 | End: 2018-04-01

## 2018-03-30 RX ORDER — MORPHINE SULFATE 10 MG/ML
6 INJECTION, SOLUTION INTRAMUSCULAR; INTRAVENOUS EVERY 10 MIN PRN
Status: DISCONTINUED | OUTPATIENT
Start: 2018-03-30 | End: 2018-03-30 | Stop reason: HOSPADM

## 2018-03-30 RX ORDER — DEXAMETHASONE SODIUM PHOSPHATE 4 MG/ML
VIAL (ML) INJECTION AS NEEDED
Status: DISCONTINUED | OUTPATIENT
Start: 2018-03-30 | End: 2018-03-30 | Stop reason: SURG

## 2018-03-30 RX ORDER — NALOXONE HYDROCHLORIDE 0.4 MG/ML
80 INJECTION, SOLUTION INTRAMUSCULAR; INTRAVENOUS; SUBCUTANEOUS AS NEEDED
Status: DISCONTINUED | OUTPATIENT
Start: 2018-03-30 | End: 2018-03-30 | Stop reason: HOSPADM

## 2018-03-30 RX ORDER — ROCURONIUM BROMIDE 10 MG/ML
INJECTION, SOLUTION INTRAVENOUS AS NEEDED
Status: DISCONTINUED | OUTPATIENT
Start: 2018-03-30 | End: 2018-03-30 | Stop reason: SURG

## 2018-03-30 RX ORDER — DEXTROSE MONOHYDRATE 25 G/50ML
50 INJECTION, SOLUTION INTRAVENOUS
Status: DISCONTINUED | OUTPATIENT
Start: 2018-03-30 | End: 2018-03-30 | Stop reason: HOSPADM

## 2018-03-30 RX ORDER — NEOSTIGMINE METHYLSULFATE 0.5 MG/ML
INJECTION INTRAVENOUS AS NEEDED
Status: DISCONTINUED | OUTPATIENT
Start: 2018-03-30 | End: 2018-03-30 | Stop reason: SURG

## 2018-03-30 RX ORDER — ROSUVASTATIN CALCIUM 5 MG/1
5 TABLET, COATED ORAL NIGHTLY
Status: DISCONTINUED | OUTPATIENT
Start: 2018-03-30 | End: 2018-04-01

## 2018-03-30 RX ORDER — GLYCOPYRROLATE 0.2 MG/ML
INJECTION INTRAMUSCULAR; INTRAVENOUS AS NEEDED
Status: DISCONTINUED | OUTPATIENT
Start: 2018-03-30 | End: 2018-03-30 | Stop reason: SURG

## 2018-03-30 RX ORDER — BISACODYL 10 MG
10 SUPPOSITORY, RECTAL RECTAL
Status: DISCONTINUED | OUTPATIENT
Start: 2018-03-30 | End: 2018-04-01

## 2018-03-30 RX ORDER — HYDROCODONE BITARTRATE AND ACETAMINOPHEN 5; 325 MG/1; MG/1
1 TABLET ORAL AS NEEDED
Status: DISCONTINUED | OUTPATIENT
Start: 2018-03-30 | End: 2018-03-30 | Stop reason: HOSPADM

## 2018-03-30 RX ORDER — HYDROMORPHONE HYDROCHLORIDE 1 MG/ML
0.6 INJECTION, SOLUTION INTRAMUSCULAR; INTRAVENOUS; SUBCUTANEOUS EVERY 5 MIN PRN
Status: DISCONTINUED | OUTPATIENT
Start: 2018-03-30 | End: 2018-03-30 | Stop reason: HOSPADM

## 2018-03-30 RX ORDER — HYDROMORPHONE HYDROCHLORIDE 1 MG/ML
0.4 INJECTION, SOLUTION INTRAMUSCULAR; INTRAVENOUS; SUBCUTANEOUS EVERY 5 MIN PRN
Status: DISCONTINUED | OUTPATIENT
Start: 2018-03-30 | End: 2018-03-30 | Stop reason: HOSPADM

## 2018-03-30 RX ORDER — SODIUM CHLORIDE, SODIUM LACTATE, POTASSIUM CHLORIDE, CALCIUM CHLORIDE 600; 310; 30; 20 MG/100ML; MG/100ML; MG/100ML; MG/100ML
INJECTION, SOLUTION INTRAVENOUS CONTINUOUS
Status: DISCONTINUED | OUTPATIENT
Start: 2018-03-30 | End: 2018-03-30

## 2018-03-30 RX ORDER — HYDROMORPHONE HYDROCHLORIDE 1 MG/ML
0.4 INJECTION, SOLUTION INTRAMUSCULAR; INTRAVENOUS; SUBCUTANEOUS EVERY 30 MIN PRN
Status: DISCONTINUED | OUTPATIENT
Start: 2018-03-30 | End: 2018-04-01

## 2018-03-30 RX ORDER — DIPHENHYDRAMINE HYDROCHLORIDE 50 MG/ML
12.5 INJECTION INTRAMUSCULAR; INTRAVENOUS EVERY 4 HOURS PRN
Status: DISCONTINUED | OUTPATIENT
Start: 2018-03-30 | End: 2018-04-01

## 2018-03-30 RX ORDER — HYDROCODONE BITARTRATE AND ACETAMINOPHEN 5; 325 MG/1; MG/1
2 TABLET ORAL AS NEEDED
Status: DISCONTINUED | OUTPATIENT
Start: 2018-03-30 | End: 2018-03-30 | Stop reason: HOSPADM

## 2018-03-30 RX ADMIN — DEXAMETHASONE SODIUM PHOSPHATE 4 MG: 4 MG/ML VIAL (ML) INJECTION at 07:36:00

## 2018-03-30 RX ADMIN — LIDOCAINE HYDROCHLORIDE 50 MG: 10 INJECTION, SOLUTION EPIDURAL; INFILTRATION; INTRACAUDAL; PERINEURAL at 07:36:00

## 2018-03-30 RX ADMIN — GLYCOPYRROLATE 0.6 MG: 0.2 INJECTION INTRAMUSCULAR; INTRAVENOUS at 08:51:00

## 2018-03-30 RX ADMIN — SODIUM CHLORIDE, SODIUM LACTATE, POTASSIUM CHLORIDE, CALCIUM CHLORIDE: 600; 310; 30; 20 INJECTION, SOLUTION INTRAVENOUS at 08:51:00

## 2018-03-30 RX ADMIN — NEOSTIGMINE METHYLSULFATE 5 MG: 0.5 INJECTION INTRAVENOUS at 08:51:00

## 2018-03-30 RX ADMIN — ROCURONIUM BROMIDE 50 MG: 10 INJECTION, SOLUTION INTRAVENOUS at 07:36:00

## 2018-03-30 RX ADMIN — CEFAZOLIN SODIUM/WATER 2 G: 2 G/20 ML SYRINGE (ML) INTRAVENOUS at 07:47:00

## 2018-03-30 RX ADMIN — EPHEDRINE SULFATE 5 MG: 50 INJECTION, SOLUTION INTRAVENOUS at 08:08:00

## 2018-03-30 RX ADMIN — SODIUM CHLORIDE, SODIUM LACTATE, POTASSIUM CHLORIDE, CALCIUM CHLORIDE: 600; 310; 30; 20 INJECTION, SOLUTION INTRAVENOUS at 09:15:00

## 2018-03-30 RX ADMIN — SODIUM CHLORIDE, SODIUM LACTATE, POTASSIUM CHLORIDE, CALCIUM CHLORIDE: 600; 310; 30; 20 INJECTION, SOLUTION INTRAVENOUS at 07:36:00

## 2018-03-30 RX ADMIN — ONDANSETRON 4 MG: 2 INJECTION INTRAMUSCULAR; INTRAVENOUS at 07:36:00

## 2018-03-30 NOTE — PROGRESS NOTES
Report received from Yovany Wilkinson RN at this time. PCA dilaudid in place 0.2/10/1.2. Pt without questions/concerns and visiting with family at this time. Will continue to monitor.

## 2018-03-30 NOTE — H&P
Horacio Ellison is a 41year old female who presents for a consult for enlarged fibroid uterus/symptomatic fibroid uterus.    Pt and  extensively counseled on recent emh u/s 2/13/18 showing an 18 cm uterus, with 16 mm possible polyp endocx and 18 tablet Rfl: 3   Multiple Vitamins-Minerals (WOMENS MULTIVITAMIN PLUS) Oral Tab Take 1 tablet by mouth daily.  Disp:  Rfl:    MetFORMIN HCl  MG Oral Tablet 24 Hr Take 1 tablet (500 mg total) by mouth daily with breakfast. Disp: 90 tablet Rfl: 3     mobile  :def by pt to next visit, when endometrial biopsy planned.       MUSCULOSKELETAL: back is not tender,FROM of the back  EXTREMITIES: no cyanosis, clubbing or edema  NEURO: Oriented times three,     ASSESSMENT AND PLAN:   Cristiana Pressley is a 4

## 2018-03-30 NOTE — PROGRESS NOTES
Metformin ER 500mg po daily has been substituted with metformin 500mg PO bid per P [employer] T Committee Protocol.     Lazarus Asher, TeresaD

## 2018-03-30 NOTE — ANESTHESIA PREPROCEDURE EVALUATION
Anesthesia PreOp Note    HPI:     Micheal Philippe is a 57year old female who presents for preoperative consultation requested by:  Ernesto Pennington MD    Date of Surgery: 3/30/2018    Procedure(s):  ABDOMINAL HYSTERECTOMY, BSO  Indication: Pelvic p TWO TIMES A DAY Disp: 100 strip Rfl: 3 Taking       Current Facility-Administered Medications Ordered in Epic:  lactated ringers infusion  Intravenous Continuous Sundeep Mendez MD   acetaminophen (TYLENOL EXTRA STRENGTH) tab 1,000 mg 1,000 mg Oral On Mallampati: II  TM distance: >3 FB  Neck ROM: full  Dental      Pulmonary - negative ROS and normal exam   Cardiovascular - normal exam  (+) hypertension,     Neuro/Psych - negative ROS     GI/Hepatic/Renal      Endo/Other    (+) diabetes mellitus,   Abd

## 2018-03-30 NOTE — ANESTHESIA POSTPROCEDURE EVALUATION
Patient: Violeta Baeza    Procedure Summary     Date:  03/30/18 Room / Location:  [facility] MAIN OR  / [facility] MAIN OR    Anesthesia Start:  0138 Anesthesia Stop:  7758    Procedure:  ABDOMINAL HYSTERECTOMY, BSO (N/A ) Diagnosis:       Pelvic pain in female

## 2018-03-30 NOTE — BRIEF OP NOTE
Pre-Operative Diagnosis: Pelvic pain in female [R10.2]  Uterine leiomyoma, unspecified location [D25.9]  Severe dysmenorrhea [N94.6]     Post-Operative Diagnosis: Pelvic pain in female [R10. 2]Uterine leiomyoma, unspecified location [D25. 9]Severe dysmenorrh

## 2018-03-30 NOTE — PLAN OF CARE
Diabetes/Glucose Control    • Glucose maintained within prescribed range Progressing        DISCHARGE PLANNING    • Discharge to home or other facility with appropriate resources  Home with  and 3 children Progressing        GASTROINTESTINAL - ADULT

## 2018-03-31 RX ORDER — IBUPROFEN 600 MG/1
600 TABLET ORAL EVERY 6 HOURS PRN
Status: DISCONTINUED | OUTPATIENT
Start: 2018-03-31 | End: 2018-04-01

## 2018-03-31 NOTE — PLAN OF CARE
Dr. Yanely Herrera called back and was made aware that novolog and metformin were held on day shift and he was ok with that.

## 2018-03-31 NOTE — PROGRESS NOTES
POD 1  Pt doing well,  Passed flatus. Ate and no nausea or vomiting. Pt already walked in the hallways without difficulty. Alert and oriented, nad  Chest cta  Heart rrr  abd soft,nt, nd, dressing c/d/I  Ext nt , scd.   No s/sx dvt    Hb 10     A/p POD 1

## 2018-04-01 VITALS
OXYGEN SATURATION: 97 % | HEART RATE: 81 BPM | DIASTOLIC BLOOD PRESSURE: 68 MMHG | TEMPERATURE: 98 F | BODY MASS INDEX: 26.58 KG/M2 | WEIGHT: 150 LBS | HEIGHT: 63 IN | RESPIRATION RATE: 16 BRPM | SYSTOLIC BLOOD PRESSURE: 108 MMHG

## 2018-04-01 RX ORDER — HYDROCODONE BITARTRATE AND ACETAMINOPHEN 7.5; 325 MG/1; MG/1
1 TABLET ORAL EVERY 6 HOURS PRN
Qty: 25 TABLET | Refills: 0 | Status: SHIPPED | OUTPATIENT
Start: 2018-04-01 | End: 2018-04-19

## 2018-04-01 NOTE — DISCHARGE SUMMARY
[facility]    Discharge Summary    Micheal Jose Maritn Patient Status:  Inpatient    3/6/1970 MRN [id number]   Location [facility] 3SE Attending Ernesto Pennington MD   Hosp Day # 2 PCP No primary care provider on file.      Ad

## 2018-04-03 ENCOUNTER — NURSE ONLY (OUTPATIENT)
Dept: OBGYN CLINIC | Facility: CLINIC | Age: 48
End: 2018-04-03

## 2018-04-03 VITALS — SYSTOLIC BLOOD PRESSURE: 108 MMHG | DIASTOLIC BLOOD PRESSURE: 68 MMHG

## 2018-04-03 DIAGNOSIS — Z48.02 ENCOUNTER FOR STAPLE REMOVAL: Primary | ICD-10-CM

## 2018-04-03 PROCEDURE — 99024 POSTOP FOLLOW-UP VISIT: CPT | Performed by: OBSTETRICS & GYNECOLOGY

## 2018-04-03 RX ORDER — LISINOPRIL 5 MG/1
TABLET ORAL
Qty: 90 TABLET | Refills: 0 | Status: SHIPPED | OUTPATIENT
Start: 2018-04-03 | End: 2018-07-24

## 2018-04-03 RX ORDER — ROSUVASTATIN CALCIUM 5 MG/1
TABLET, COATED ORAL
Qty: 90 TABLET | Refills: 0 | Status: SHIPPED | OUTPATIENT
Start: 2018-04-03 | End: 2018-07-24

## 2018-04-03 NOTE — TELEPHONE ENCOUNTER
Cholesterol Medications  Protocol Criteria:  · Appointment scheduled in the past 12 months or in the next 3 months  · ALT & LDL on file in the past 12 months  · ALT result < 80  · LDL result <130   Recent Outpatient Visits            1 week ago Uterine lei Guille Petty MD    Office Visit    1 month ago Uterine leiomyoma, unspecified location    Lupe Sainz MD    Office Visit    2 months ago Type 2 diabet LISINOPRIL 5 MG Oral Tab [Pharmacy Med Name: Lisinopril Oral Tablet 5 MG] 90 tablet 0     Sig: TAKE 1 TABLET BY MOUTH DAILY             Refill approved per protocol.   LR 4-29-17 # 90 +3    Future Appointments  Date Time Provider Elia Tolliver   4/3/20

## 2018-04-04 ENCOUNTER — TELEPHONE (OUTPATIENT)
Dept: OBGYN CLINIC | Facility: CLINIC | Age: 48
End: 2018-04-04

## 2018-04-04 NOTE — TELEPHONE ENCOUNTER
----- Message from Constanza Villela MD sent at 4/1/2018 12:25 PM CDT -----  Please have pt fu with her PCP for this ecg

## 2018-04-13 NOTE — OPERATIVE REPORT
[facility]    PATIENT'S NAME: Peggy Funez   ATTENDING PHYSICIAN: Colt Ross MD   OPERATING PHYSICIAN: Jeneen Rinne, DO   PATIENT ACCOUNT#:   [de-identified]    LOCATION:  3SE [facility] #:   [id number]       DATE OF B the infundibulopelvic ligament and came across this using the LigaSure device, cauterizing and cutting and then used suture ligating after this. This was sent for permanent section. The ureters were noted to be out of harm's way.   Copious irrigation, rev

## 2018-04-19 ENCOUNTER — OFFICE VISIT (OUTPATIENT)
Dept: OBGYN CLINIC | Facility: CLINIC | Age: 48
End: 2018-04-19

## 2018-04-19 VITALS — SYSTOLIC BLOOD PRESSURE: 110 MMHG | DIASTOLIC BLOOD PRESSURE: 69 MMHG | HEART RATE: 76 BPM

## 2018-04-19 DIAGNOSIS — D25.9 UTERINE LEIOMYOMA, UNSPECIFIED LOCATION: Primary | ICD-10-CM

## 2018-04-19 PROCEDURE — 99024 POSTOP FOLLOW-UP VISIT: CPT | Performed by: OBSTETRICS & GYNECOLOGY

## 2018-04-20 NOTE — PROGRESS NOTES
HPI:   Gwendolyn Blackburn is a 57year old female who presents for a f/u total hysterectomy. Pt stated she feels well.        Wt Readings from Last 6 Encounters:  03/30/18 : 150 lb (68 kg)  03/23/18 : 152 lb (68.9 kg)  02/28/18 : 150 lb (68 kg)  01/23/18 ([facility]    • High blood pressure    • High cholesterol    • Lipid screening 10/19/2013    per NG   • Type 2 diabetes mellitus without complication, without long-term current use of insulin ([facility] 6/29/2017   • Uterine fibroid 2016    lupron injections.       Ramses Ro Counseled, all question answered. Well woman counseling. . Self breast exam explained. Health maintenance. There is no height or weight on file to calculate BMI., recommended low fat diet and aerobic exercise 30 minutes three times weekly.   The patient

## 2018-04-20 NOTE — OPERATIVE REPORT
[facility]    PATIENT'S NAME: Cheri Piper   ATTENDING PHYSICIAN: Colt Boykin MD   OPERATING PHYSICIAN: Uvaldo Boykin MD   PATIENT ACCOUNT#:   [id number]    LOCATION:  [address] [place] [address] #:   [id number]       DATE The uterus was noted to be enlarged, consistent with a fibroid uterus. Uterus was elevated. The round ligaments were suture ligated bilaterally. We performed dissection retroperitoneally, and the ureters were noted to be away from the operative field.

## 2018-04-24 ENCOUNTER — OFFICE VISIT (OUTPATIENT)
Dept: FAMILY MEDICINE CLINIC | Facility: CLINIC | Age: 48
End: 2018-04-24

## 2018-04-24 VITALS — HEART RATE: 74 BPM | SYSTOLIC BLOOD PRESSURE: 114 MMHG | DIASTOLIC BLOOD PRESSURE: 74 MMHG | TEMPERATURE: 99 F

## 2018-04-24 DIAGNOSIS — E11.9 TYPE 2 DIABETES MELLITUS WITHOUT COMPLICATION, WITHOUT LONG-TERM CURRENT USE OF INSULIN (HCC): Primary | ICD-10-CM

## 2018-04-24 DIAGNOSIS — R94.31 ABNORMAL EKG: ICD-10-CM

## 2018-04-24 PROCEDURE — 99212 OFFICE O/P EST SF 10 MIN: CPT | Performed by: FAMILY MEDICINE

## 2018-04-24 PROCEDURE — 99214 OFFICE O/P EST MOD 30 MIN: CPT | Performed by: FAMILY MEDICINE

## 2018-04-24 NOTE — PROGRESS NOTES
Patient had her hysterectomy performed early this month by Dr. Jesus Ballesteros. Patient had abnormal EKG demonstrating mild ST segment changes in V3 V4. As result to have a workup.   Patient denies any chest pain shortness of breath nausea vomiting prior to her June.

## 2018-05-07 RX ORDER — ASPIRIN 81 MG/1
TABLET ORAL
Qty: 90 TABLET | Refills: 0 | Status: SHIPPED | OUTPATIENT
Start: 2018-05-07 | End: 2019-02-02

## 2018-05-07 NOTE — TELEPHONE ENCOUNTER
Non-Narcotic Pain Medication:  Refill Protocol Appointment Criteria  · Appointment scheduled in the past 6 months or in the next 3 months  Recent Outpatient Visits            1 week ago Type 2 diabetes mellitus without complication, without long-term curre

## 2018-05-11 ENCOUNTER — OFFICE VISIT (OUTPATIENT)
Dept: OBGYN CLINIC | Facility: CLINIC | Age: 48
End: 2018-05-11

## 2018-05-11 ENCOUNTER — TELEPHONE (OUTPATIENT)
Dept: OBGYN CLINIC | Facility: CLINIC | Age: 48
End: 2018-05-11

## 2018-05-11 VITALS — SYSTOLIC BLOOD PRESSURE: 110 MMHG | DIASTOLIC BLOOD PRESSURE: 62 MMHG | WEIGHT: 150 LBS | BODY MASS INDEX: 27 KG/M2

## 2018-05-11 DIAGNOSIS — D25.9 UTERINE LEIOMYOMA, UNSPECIFIED LOCATION: Primary | ICD-10-CM

## 2018-05-11 PROCEDURE — 99024 POSTOP FOLLOW-UP VISIT: CPT | Performed by: OBSTETRICS & GYNECOLOGY

## 2018-05-11 NOTE — TELEPHONE ENCOUNTER
Patient dropped off [employer] forms to be completed. Patient signed RENETTA form. Patient states she will pay $25 form completion fee at time of  at [facility].

## 2018-05-12 NOTE — PROGRESS NOTES
HPI:   Carlos Hernandez is a 57year old female who presents for a f/u s/p total hysterectomy , pt stated she is feeling well. Normal daily activities.       Wt Readings from Last 6 Encounters:  05/11/18 : 150 lb (68 kg)  03/30/18 : 150 lb (68 kg)  03/2 ([facility]    • High blood pressure    • High cholesterol    • Lipid screening 10/19/2013    per NG   • Type 2 diabetes mellitus without complication, without long-term current use of insulin ([facility] 6/29/2017   • Uterine fibroid 2016    lupron injections.       Zena Oseguera presents for a f/u exam s/p total hysterectomy for sx fibroid uterus. Pt counseled kamran, all questions answered, all questions answered. Well woman counseling. Self breast exam explained. Health maintenance Body mass index is 26.57 kg/m². , recommen no chest pain, +cough, and + shortness of breath.

## 2018-05-15 NOTE — TELEPHONE ENCOUNTER
Dr. Jocelin Rodriguez    Please sign off on form:  -Highlight the patient and hit \"Chart\" button. -In Chart Review, w/in the Encounter tab - click 1 time on the Telephone call encounter for 5-11-18. Scroll down the telephone encounter.  -Click \"scan on\" blue Hyperlink under \"Media\" heading for [employer] FML[employer] Dr. Jocelin Rodriguez 5-11-18 w/in the telephone enc.  -Click on Acknowledge button at the bottom right corner and left-click onto image, signature stamp appears and drag signature to Provider signature line. Stamp will turn blue. Close window.     Thank you,  Torres Craig

## 2018-05-16 NOTE — TELEPHONE ENCOUNTER
Message left for patient that form is complete and ready to be picked up at [address] [place] [address]. Form fee of $25 due at .

## 2018-05-31 ENCOUNTER — OFFICE VISIT (OUTPATIENT)
Dept: FAMILY MEDICINE CLINIC | Facility: CLINIC | Age: 48
End: 2018-05-31

## 2018-05-31 VITALS
TEMPERATURE: 99 F | HEART RATE: 87 BPM | DIASTOLIC BLOOD PRESSURE: 67 MMHG | BODY MASS INDEX: 26.58 KG/M2 | HEIGHT: 63 IN | SYSTOLIC BLOOD PRESSURE: 105 MMHG | WEIGHT: 150 LBS

## 2018-05-31 DIAGNOSIS — Z12.31 SCREENING MAMMOGRAM, ENCOUNTER FOR: ICD-10-CM

## 2018-05-31 DIAGNOSIS — J02.9 SORE THROAT: Primary | ICD-10-CM

## 2018-05-31 PROCEDURE — 87880 STREP A ASSAY W/OPTIC: CPT | Performed by: FAMILY MEDICINE

## 2018-05-31 PROCEDURE — 99214 OFFICE O/P EST MOD 30 MIN: CPT | Performed by: FAMILY MEDICINE

## 2018-05-31 PROCEDURE — 99212 OFFICE O/P EST SF 10 MIN: CPT | Performed by: FAMILY MEDICINE

## 2018-05-31 RX ORDER — AMOXICILLIN AND CLAVULANATE POTASSIUM 875; 125 MG/1; MG/1
1 TABLET, FILM COATED ORAL 2 TIMES DAILY
Qty: 20 TABLET | Refills: 0 | Status: SHIPPED | OUTPATIENT
Start: 2018-05-31 | End: 2018-06-10

## 2018-05-31 RX ORDER — METFORMIN HYDROCHLORIDE 500 MG/1
TABLET, EXTENDED RELEASE ORAL
Qty: 30 TABLET | Refills: 1 | Status: SHIPPED | OUTPATIENT
Start: 2018-05-31 | End: 2018-07-24

## 2018-05-31 NOTE — PROGRESS NOTES
Blood pressure 105/67, pulse 87, temperature 99.1 °F (37.3 °C), temperature source Oral, height 5' 3\" (1.6 m), weight 150 lb (68 kg), not currently breastfeeding. 3 day history of cough with yellow phlegm.   Cough is nocturnal also frontal headache some

## 2018-06-14 ENCOUNTER — TELEPHONE (OUTPATIENT)
Dept: FAMILY MEDICINE CLINIC | Facility: CLINIC | Age: 48
End: 2018-06-14

## 2018-06-14 ENCOUNTER — HOSPITAL ENCOUNTER (OUTPATIENT)
Dept: CV DIAGNOSTICS | Facility: HOSPITAL | Age: 48
Discharge: HOME | End: 2018-06-14
Attending: FAMILY MEDICINE
Payer: COMMERCIAL

## 2018-06-14 DIAGNOSIS — R94.31 ABNORMAL EKG: ICD-10-CM

## 2018-06-14 DIAGNOSIS — R93.1 ABNORMAL ECHOCARDIOGRAM: Primary | ICD-10-CM

## 2018-06-14 DIAGNOSIS — E11.9 TYPE 2 DIABETES MELLITUS WITHOUT COMPLICATION, WITHOUT LONG-TERM CURRENT USE OF INSULIN (HCC): ICD-10-CM

## 2018-06-14 PROCEDURE — 93017 CV STRESS TEST TRACING ONLY: CPT | Performed by: FAMILY MEDICINE

## 2018-06-14 PROCEDURE — 93350 STRESS TTE ONLY: CPT | Performed by: FAMILY MEDICINE

## 2018-06-14 PROCEDURE — 93018 CV STRESS TEST I&R ONLY: CPT | Performed by: FAMILY MEDICINE

## 2018-06-14 PROCEDURE — 93016 CV STRESS TEST SUPVJ ONLY: CPT | Performed by: FAMILY MEDICINE

## 2018-06-14 NOTE — TELEPHONE ENCOUNTER
Patient contacted; she did confirm she spoke to Dr Dilshad Ash about Echo study. She was given cardiology ph# and intends to call. No other details of the results were given. Report is not ready yet.     Augustin Comment, DO   Em Rn Triage 34 minutes ago

## 2018-06-19 DIAGNOSIS — R94.39 ABNORMAL STRESS ECHOCARDIOGRAM: Primary | ICD-10-CM

## 2018-06-27 ENCOUNTER — TELEPHONE (OUTPATIENT)
Dept: FAMILY MEDICINE CLINIC | Facility: CLINIC | Age: 48
End: 2018-06-27

## 2018-06-27 NOTE — TELEPHONE ENCOUNTER
Per Billie Hinkle the patient has an appt with them on Friday 6-29-18 and they would like copies labs and recent notes or any records. Please fax to [phone].

## 2018-06-28 NOTE — TELEPHONE ENCOUNTER
Notes, recent labs, and echo stress have been faxed to number provided and confirmation has been received.

## 2018-06-28 NOTE — TELEPHONE ENCOUNTER
Dominique Villa requesting progress notes faxed- received other information but no notes    FAX# [phone]    Pt has appt early tomorrow morning

## 2018-07-07 ENCOUNTER — HOSPITAL ENCOUNTER (OUTPATIENT)
Dept: MAMMOGRAPHY | Facility: HOSPITAL | Age: 48
Discharge: HOME | End: 2018-07-07
Attending: FAMILY MEDICINE
Payer: COMMERCIAL

## 2018-07-07 DIAGNOSIS — Z12.31 SCREENING MAMMOGRAM, ENCOUNTER FOR: ICD-10-CM

## 2018-07-07 PROCEDURE — 77067 SCR MAMMO BI INCL CAD: CPT | Performed by: FAMILY MEDICINE

## 2018-07-24 ENCOUNTER — OFFICE VISIT (OUTPATIENT)
Dept: FAMILY MEDICINE CLINIC | Facility: CLINIC | Age: 48
End: 2018-07-24
Payer: COMMERCIAL

## 2018-07-24 VITALS
HEART RATE: 101 BPM | SYSTOLIC BLOOD PRESSURE: 110 MMHG | TEMPERATURE: 99 F | BODY MASS INDEX: 28 KG/M2 | WEIGHT: 156 LBS | DIASTOLIC BLOOD PRESSURE: 64 MMHG

## 2018-07-24 DIAGNOSIS — Z23 NEED FOR VACCINATION: ICD-10-CM

## 2018-07-24 DIAGNOSIS — E11.9 TYPE 2 DIABETES MELLITUS WITHOUT COMPLICATION, WITHOUT LONG-TERM CURRENT USE OF INSULIN (HCC): Primary | ICD-10-CM

## 2018-07-24 DIAGNOSIS — R94.31 ABNORMAL EKG: ICD-10-CM

## 2018-07-24 DIAGNOSIS — R93.1 ABNORMAL ECHOCARDIOGRAM: ICD-10-CM

## 2018-07-24 PROCEDURE — 90732 PPSV23 VACC 2 YRS+ SUBQ/IM: CPT | Performed by: FAMILY MEDICINE

## 2018-07-24 PROCEDURE — 90471 IMMUNIZATION ADMIN: CPT | Performed by: FAMILY MEDICINE

## 2018-07-24 PROCEDURE — 99214 OFFICE O/P EST MOD 30 MIN: CPT | Performed by: FAMILY MEDICINE

## 2018-07-24 PROCEDURE — 99212 OFFICE O/P EST SF 10 MIN: CPT | Performed by: FAMILY MEDICINE

## 2018-07-24 RX ORDER — LISINOPRIL 5 MG/1
5 TABLET ORAL
Qty: 90 TABLET | Refills: 3 | Status: SHIPPED | OUTPATIENT
Start: 2018-07-24 | End: 2019-07-05

## 2018-07-24 RX ORDER — METFORMIN HYDROCHLORIDE 500 MG/1
500 TABLET, EXTENDED RELEASE ORAL
Qty: 90 TABLET | Refills: 3 | Status: SHIPPED | OUTPATIENT
Start: 2018-07-24 | End: 2019-07-11

## 2018-07-24 RX ORDER — ATORVASTATIN CALCIUM 40 MG/1
40 TABLET, FILM COATED ORAL NIGHTLY
COMMUNITY
End: 2019-10-25

## 2018-07-24 NOTE — PROGRESS NOTES
Diabetes f/u  Sugars 106-130's never over 150's  Check 3 times a week. Concerns regarding high dose of atorvastatin  Discussed. Due for echo in oct, pt aware. Diabetic Visit    Patient denies problems with their medication.   Denies ulcers, chest jeanne

## 2018-10-08 ENCOUNTER — LAB ENCOUNTER (OUTPATIENT)
Dept: LAB | Age: 48
End: 2018-10-08
Attending: FAMILY MEDICINE
Payer: COMMERCIAL

## 2018-10-08 DIAGNOSIS — I25.10 DISEASE OF CARDIOVASCULAR SYSTEM: Primary | ICD-10-CM

## 2018-10-08 PROCEDURE — 80061 LIPID PANEL: CPT

## 2018-10-08 PROCEDURE — 36415 COLL VENOUS BLD VENIPUNCTURE: CPT

## 2018-10-21 ENCOUNTER — TELEPHONE (OUTPATIENT)
Dept: OBGYN CLINIC | Facility: CLINIC | Age: 48
End: 2018-10-21

## 2019-01-29 ENCOUNTER — APPOINTMENT (OUTPATIENT)
Dept: LAB | Age: 49
End: 2019-01-29
Attending: FAMILY MEDICINE
Payer: COMMERCIAL

## 2019-01-29 DIAGNOSIS — E11.9 TYPE 2 DIABETES MELLITUS WITHOUT COMPLICATION, WITHOUT LONG-TERM CURRENT USE OF INSULIN (HCC): ICD-10-CM

## 2019-01-29 DIAGNOSIS — R94.31 ABNORMAL EKG: ICD-10-CM

## 2019-01-29 LAB
ALBUMIN SERPL BCP-MCNC: 4.3 G/DL (ref 3.5–4.8)
ALBUMIN/GLOB SERPL: 1.6 {RATIO} (ref 1–2)
ALP SERPL-CCNC: 67 U/L (ref 32–100)
ALT SERPL-CCNC: 61 U/L (ref 14–54)
ANION GAP SERPL CALC-SCNC: 12 MMOL/L (ref 0–18)
AST SERPL-CCNC: 44 U/L (ref 15–41)
BILIRUB SERPL-MCNC: 1.3 MG/DL (ref 0.3–1.2)
BUN SERPL-MCNC: 8 MG/DL (ref 8–20)
BUN/CREAT SERPL: 12.3 (ref 10–20)
CALCIUM SERPL-MCNC: 8.9 MG/DL (ref 8.5–10.5)
CHLORIDE SERPL-SCNC: 100 MMOL/L (ref 95–110)
CHOLEST SERPL-MCNC: 109 MG/DL (ref 110–200)
CO2 SERPL-SCNC: 21 MMOL/L (ref 22–32)
CREAT SERPL-MCNC: 0.65 MG/DL (ref 0.5–1.5)
CREAT UR-MCNC: <10 MG/DL
EST. AVERAGE GLUCOSE BLD GHB EST-MCNC: 197 MG/DL (ref 68–126)
GLOBULIN PLAS-MCNC: 2.7 G/DL (ref 2.5–3.7)
GLUCOSE SERPL-MCNC: 146 MG/DL (ref 70–99)
HBA1C MFR BLD HPLC: 8.5 % (ref ?–5.7)
HDLC SERPL-MCNC: 30 MG/DL
LDLC SERPL CALC-MCNC: 47 MG/DL (ref 0–99)
MICROALBUMIN UR-MCNC: 0 MG/DL (ref 0–1.8)
NONHDLC SERPL-MCNC: 79 MG/DL
OSMOLALITY UR CALC.SUM OF ELEC: 277 MOSM/KG (ref 275–295)
PATIENT FASTING: YES
POTASSIUM SERPL-SCNC: 3.6 MMOL/L (ref 3.3–5.1)
PROT SERPL-MCNC: 7 G/DL (ref 5.9–8.4)
SODIUM SERPL-SCNC: 133 MMOL/L (ref 136–144)
TRIGL SERPL-MCNC: 158 MG/DL (ref 1–149)

## 2019-01-29 PROCEDURE — 83036 HEMOGLOBIN GLYCOSYLATED A1C: CPT

## 2019-01-29 PROCEDURE — 36415 COLL VENOUS BLD VENIPUNCTURE: CPT

## 2019-01-29 PROCEDURE — 80061 LIPID PANEL: CPT

## 2019-01-29 PROCEDURE — 82043 UR ALBUMIN QUANTITATIVE: CPT

## 2019-01-29 PROCEDURE — 80053 COMPREHEN METABOLIC PANEL: CPT

## 2019-01-29 PROCEDURE — 82570 ASSAY OF URINE CREATININE: CPT

## 2019-02-02 ENCOUNTER — OFFICE VISIT (OUTPATIENT)
Dept: FAMILY MEDICINE CLINIC | Facility: CLINIC | Age: 49
End: 2019-02-02
Payer: COMMERCIAL

## 2019-02-02 VITALS
TEMPERATURE: 98 F | BODY MASS INDEX: 28 KG/M2 | HEART RATE: 73 BPM | DIASTOLIC BLOOD PRESSURE: 66 MMHG | WEIGHT: 157 LBS | SYSTOLIC BLOOD PRESSURE: 103 MMHG

## 2019-02-02 DIAGNOSIS — E11.65 UNCONTROLLED TYPE 2 DIABETES MELLITUS WITH HYPERGLYCEMIA (HCC): Primary | ICD-10-CM

## 2019-02-02 DIAGNOSIS — E66.3 OVERWEIGHT (BMI 25.0-29.9): ICD-10-CM

## 2019-02-02 PROCEDURE — 99214 OFFICE O/P EST MOD 30 MIN: CPT | Performed by: FAMILY MEDICINE

## 2019-02-02 PROCEDURE — 99212 OFFICE O/P EST SF 10 MIN: CPT | Performed by: FAMILY MEDICINE

## 2019-02-02 RX ORDER — PEN NEEDLE, DIABETIC 30 GX3/16"
1 NEEDLE, DISPOSABLE MISCELLANEOUS DAILY
Qty: 90 EACH | Refills: 0 | Status: SHIPPED | OUTPATIENT
Start: 2019-02-02 | End: 2019-02-04

## 2019-02-02 NOTE — PROGRESS NOTES
High ldl chol  Sugars out of control  \"The holidays were hard for me to control my diet. \"    No hypglycemic episodes  Discussed free style Lois Peaches  But decided to control sugars better      A/p  1.  Uncontrolled type 2 diabetes mellitus with hyperglycemia (

## 2019-02-04 ENCOUNTER — TELEPHONE (OUTPATIENT)
Dept: OTHER | Age: 49
End: 2019-02-04

## 2019-02-04 ENCOUNTER — TELEPHONE (OUTPATIENT)
Dept: FAMILY MEDICINE CLINIC | Facility: CLINIC | Age: 49
End: 2019-02-04

## 2019-02-04 RX ORDER — PEN NEEDLE, DIABETIC 30 GX3/16"
1 NEEDLE, DISPOSABLE MISCELLANEOUS DAILY
Qty: 90 EACH | Refills: 1 | Status: SHIPPED | OUTPATIENT
Start: 2019-02-04 | End: 2019-05-05

## 2019-02-04 RX ORDER — ASPIRIN 81 MG
TABLET, DELAYED RELEASE (ENTERIC COATED) ORAL
Qty: 90 TABLET | Refills: 11 | Status: SHIPPED | OUTPATIENT
Start: 2019-02-04 | End: 2020-07-28

## 2019-02-04 NOTE — TELEPHONE ENCOUNTER
Please advise in regards to refill request. Thank You  Refill Protocol Appointment Criteria  · Appointment scheduled in the past 6 months or in the next 3 months  Recent Outpatient Visits            2 days ago Uncontrolled type 2 diabetes mellitus with hyp

## 2019-02-04 NOTE — TELEPHONE ENCOUNTER
Per Daughter pt pen needles have not been received at the pharmacy. I have resend the pen needles.   Diabetes Medications  Protocol Criteria:  · Appointment scheduled in the past 6 months or the next 3 months  · A1C < 7.5 in the past 6 months  · Creatinine

## 2019-02-15 ENCOUNTER — TELEPHONE (OUTPATIENT)
Dept: OTHER | Age: 49
End: 2019-02-15

## 2019-02-15 NOTE — TELEPHONE ENCOUNTER
Pt calling states Pharmacy never received JÄRVENPÄÄ Rx. Pt states she has an appointment with Dr. [facility] tomorrow and will have diabetic teaching. [address] states they never received Saxenda, only pen needles.  Verified pen needles that were sent

## 2019-02-16 ENCOUNTER — TELEPHONE (OUTPATIENT)
Dept: FAMILY MEDICINE CLINIC | Facility: CLINIC | Age: 49
End: 2019-02-16

## 2019-02-16 NOTE — TELEPHONE ENCOUNTER
Eric Prior Authorization request for     •  Liraglutide -Weight Management (SAXENDA) 18 MG/3ML Subcutaneous Solution Pen-injector, Inject 0.6 mg into the skin daily for 7 days, THEN 1.2 mg daily for 7 days, THEN 1.8 mg daily for 7 days, THEN 2.4 mg daily f

## 2019-02-18 NOTE — TELEPHONE ENCOUNTER
Noted.    Jomar French, DO   Em Fm Lmb Lpn/Cma; Em Rn Triage 41 minutes ago (1:54 PM)      Discontinue Saxenda start [facility]

## 2019-02-18 NOTE — TELEPHONE ENCOUNTER
Contacted [facility] spoke with rep Sanjay Espinosa to initiate the PA for Saxenda. Rep states drug is not covered.

## 2019-07-05 ENCOUNTER — TELEPHONE (OUTPATIENT)
Dept: FAMILY MEDICINE CLINIC | Facility: CLINIC | Age: 49
End: 2019-07-05

## 2019-07-05 DIAGNOSIS — E11.9 TYPE 2 DIABETES MELLITUS WITHOUT COMPLICATION, WITHOUT LONG-TERM CURRENT USE OF INSULIN (HCC): ICD-10-CM

## 2019-07-05 RX ORDER — LISINOPRIL 5 MG/1
TABLET ORAL
Qty: 90 TABLET | Refills: 1 | Status: SHIPPED | OUTPATIENT
Start: 2019-07-05 | End: 2019-07-11

## 2019-07-05 NOTE — TELEPHONE ENCOUNTER
Diabetes Medications; PROTOCOL FAILED. PLEASE ADVISE ON REFILL.  THANKS  Protocol Criteria:  · Appointment scheduled in the past 6 months or the next 3 months  · A1C < 7.5 in the past 6 months  · Creatinine in the past 12 months  · Creatinine result < 1.5
Refill request for Accu-Chek Maxine Plus Test Strips to be tested twice daily, quantity of 50 strips with 3 refills, was approved and sent to patient's pharmacy.
normal...

## 2019-07-05 NOTE — TELEPHONE ENCOUNTER
Dr Alcides Myles, . please advise. Protocol failed.     Please reply to pool: EM CALL CENTER--please schedule follow-up with Dr Alfred Guzman    Diabetes Medications  Protocol Criteria:  · Appointment scheduled in the past 6 months or the next 3 months  · A1C < 7.5 Ralph Steve DO    Office Visit    1 year ago Uterine leiomyoma, unspecified location    [facility], [facility], [address] [place] [address] Negin Petty MD    Office Visit    1 year ago Type 2 diabetes mellitus without complication, without

## 2019-07-05 NOTE — TELEPHONE ENCOUNTER
Aurora from [facility] called for this pt's med:  MetFORMIN HCl  MG Oral Tablet 24 Hr   07/24/18 07/24/19 Eddie Caban, DO     Take 1 tablet (500 mg total) by mouth daily with breakfast.     Refill request.     Thank you.

## 2019-07-09 RX ORDER — METFORMIN HYDROCHLORIDE 500 MG/1
TABLET, EXTENDED RELEASE ORAL
Qty: 30 TABLET | Refills: 2 | OUTPATIENT
Start: 2019-07-09

## 2019-07-09 NOTE — TELEPHONE ENCOUNTER
Suzy Foster called from [place] to follow up on the medication request.  She stated Pt is out of the medicine

## 2019-07-09 NOTE — TELEPHONE ENCOUNTER
Please advise. Dr. Lesly Mcmillan is out of the office.  The patient has a jimena HA1C  Last seen 2/2/19 for diabetes      11:55 AM                Lisa called from [address] [place] [address] to follow up on the medication request.  She stated Pt is out of the medicine

## 2019-07-11 DIAGNOSIS — E11.9 TYPE 2 DIABETES MELLITUS WITHOUT COMPLICATION, WITHOUT LONG-TERM CURRENT USE OF INSULIN (HCC): ICD-10-CM

## 2019-07-11 RX ORDER — LISINOPRIL 5 MG/1
TABLET ORAL
Qty: 30 TABLET | Refills: 0 | Status: SHIPPED | OUTPATIENT
Start: 2019-07-11 | End: 2019-12-27

## 2019-07-11 RX ORDER — METFORMIN HYDROCHLORIDE 500 MG/1
500 TABLET, EXTENDED RELEASE ORAL
Qty: 30 TABLET | Refills: 3 | Status: SHIPPED | OUTPATIENT
Start: 2019-07-11 | End: 2019-08-03 | Stop reason: DRUGHIGH

## 2019-07-23 ENCOUNTER — APPOINTMENT (OUTPATIENT)
Dept: LAB | Age: 49
End: 2019-07-23
Attending: FAMILY MEDICINE
Payer: COMMERCIAL

## 2019-07-23 DIAGNOSIS — E11.9 TYPE 2 DIABETES MELLITUS WITHOUT COMPLICATION, WITHOUT LONG-TERM CURRENT USE OF INSULIN (HCC): ICD-10-CM

## 2019-07-23 LAB
ALBUMIN SERPL-MCNC: 4 G/DL (ref 3.4–5)
ALBUMIN/GLOB SERPL: 1.2 {RATIO} (ref 1–2)
ALP LIVER SERPL-CCNC: 84 U/L (ref 39–100)
ALT SERPL-CCNC: 92 U/L (ref 13–56)
ANION GAP SERPL CALC-SCNC: 8 MMOL/L (ref 0–18)
AST SERPL-CCNC: 58 U/L (ref 15–37)
BILIRUB SERPL-MCNC: 1.2 MG/DL (ref 0.1–2)
BUN BLD-MCNC: 10 MG/DL (ref 7–18)
BUN/CREAT SERPL: 14.3 (ref 10–20)
CALCIUM BLD-MCNC: 9.2 MG/DL (ref 8.5–10.1)
CHLORIDE SERPL-SCNC: 102 MMOL/L (ref 98–112)
CHOLEST SMN-MCNC: 123 MG/DL (ref ?–200)
CO2 SERPL-SCNC: 27 MMOL/L (ref 21–32)
CREAT BLD-MCNC: 0.7 MG/DL (ref 0.55–1.02)
CREAT UR-SCNC: <13 MG/DL
EST. AVERAGE GLUCOSE BLD GHB EST-MCNC: 246 MG/DL (ref 68–126)
GLOBULIN PLAS-MCNC: 3.3 G/DL (ref 2.8–4.4)
GLUCOSE BLD-MCNC: 185 MG/DL (ref 70–99)
HBA1C MFR BLD HPLC: 10.2 % (ref ?–5.7)
HDLC SERPL-MCNC: 36 MG/DL (ref 40–59)
LDLC SERPL CALC-MCNC: 57 MG/DL (ref ?–100)
M PROTEIN MFR SERPL ELPH: 7.3 G/DL (ref 6.4–8.2)
MICROALBUMIN UR-MCNC: <0.5 MG/DL
NONHDLC SERPL-MCNC: 87 MG/DL (ref ?–130)
OSMOLALITY SERPL CALC.SUM OF ELEC: 288 MOSM/KG (ref 275–295)
PATIENT FASTING: YES
PATIENT FASTING: YES
POTASSIUM SERPL-SCNC: 3.6 MMOL/L (ref 3.5–5.1)
SODIUM SERPL-SCNC: 137 MMOL/L (ref 136–145)
TRIGL SERPL-MCNC: 152 MG/DL (ref 30–149)
VLDLC SERPL CALC-MCNC: 30 MG/DL (ref 0–30)

## 2019-07-23 PROCEDURE — 36415 COLL VENOUS BLD VENIPUNCTURE: CPT

## 2019-07-23 PROCEDURE — 82043 UR ALBUMIN QUANTITATIVE: CPT

## 2019-07-23 PROCEDURE — 80053 COMPREHEN METABOLIC PANEL: CPT

## 2019-07-23 PROCEDURE — 83036 HEMOGLOBIN GLYCOSYLATED A1C: CPT

## 2019-07-23 PROCEDURE — 82570 ASSAY OF URINE CREATININE: CPT

## 2019-07-23 PROCEDURE — 80061 LIPID PANEL: CPT

## 2019-07-25 ENCOUNTER — TELEPHONE (OUTPATIENT)
Dept: FAMILY MEDICINE CLINIC | Facility: CLINIC | Age: 49
End: 2019-07-25

## 2019-07-25 NOTE — TELEPHONE ENCOUNTER
----- Message from Angie العراقي DO sent at 7/25/2019 10:30 AM CDT -----  High sugars  Send to [place] [facility]

## 2019-07-30 NOTE — TELEPHONE ENCOUNTER
Spoke to patient and informed of message below. Pt verbalized understanding and scheduled appt with [facility].

## 2019-08-03 ENCOUNTER — OFFICE VISIT (OUTPATIENT)
Dept: FAMILY MEDICINE CLINIC | Facility: CLINIC | Age: 49
End: 2019-08-03
Payer: COMMERCIAL

## 2019-08-03 VITALS
RESPIRATION RATE: 13 BRPM | HEIGHT: 63 IN | WEIGHT: 152.19 LBS | BODY MASS INDEX: 26.96 KG/M2 | HEART RATE: 73 BPM | DIASTOLIC BLOOD PRESSURE: 64 MMHG | SYSTOLIC BLOOD PRESSURE: 99 MMHG

## 2019-08-03 DIAGNOSIS — E11.9 TYPE 2 DIABETES MELLITUS WITHOUT COMPLICATION, WITHOUT LONG-TERM CURRENT USE OF INSULIN (HCC): Primary | ICD-10-CM

## 2019-08-03 PROCEDURE — 99213 OFFICE O/P EST LOW 20 MIN: CPT | Performed by: PHYSICIAN ASSISTANT

## 2019-08-03 NOTE — ASSESSMENT & PLAN NOTE
Increase Metformin 1000 mg PO BID. Discussed lab results with patient. Goals  1. Preprandial glucose targets  mg/dL. 2. Systolic blood pressure < 130.  And diastolic blood pressure < 80.  3. Hemoglobin A1C < 7%.  4. LDL Cholesterol: LDL Goal <100 w

## 2019-08-03 NOTE — PROGRESS NOTES
HPI:     Diabetes   She presents for her initial diabetic visit. She has type 2 diabetes mellitus. The initial diagnosis of diabetes was made 2 years ago. Pertinent negatives for hypoglycemia include no dizziness or headaches.  Pertinent negatives for diabe Past Medical History:   Diagnosis Date   • Diabetes ([facility]    • High blood pressure    • High cholesterol    • Lipid screening 10/19/2013    per NG   • Type 2 diabetes mellitus without complication, without long-term current use of insulin ([facility] 6/29/20 file    Other Topics      Concerns:         Service: Not Asked        Blood Transfusions: Not Asked        Caffeine Concern: Yes          coffee, rarely        Occupational Exposure: Not Asked        Hobby Hazards: Not Asked        Sleep Concern: N mood and affect. Assessment and Plan[de-identified]     Problem List Items Addressed This Visit        Endocrine    Type 2 diabetes mellitus without complication, without long-term current use of insulin (HCC) - Primary     Increase Metformin 1000 mg PO BID.  Disc

## 2019-08-05 RX ORDER — BLOOD SUGAR DIAGNOSTIC
STRIP MISCELLANEOUS
Qty: 100 STRIP | Refills: 3 | Status: SHIPPED | OUTPATIENT
Start: 2019-08-05 | End: 2020-08-28

## 2019-08-05 NOTE — TELEPHONE ENCOUNTER
Refill passed per [facility] protocol.   Refill Protocol Appointment Criteria  · Appointment scheduled in the past 12 months or in the next 3 months  Recent Outpatient Visits            2 days ago Type 2 diabetes mellitus without complication, without

## 2019-08-13 ENCOUNTER — OFFICE VISIT (OUTPATIENT)
Dept: FAMILY MEDICINE CLINIC | Facility: CLINIC | Age: 49
End: 2019-08-13
Payer: COMMERCIAL

## 2019-08-13 VITALS
HEART RATE: 67 BPM | DIASTOLIC BLOOD PRESSURE: 66 MMHG | WEIGHT: 148 LBS | TEMPERATURE: 98 F | SYSTOLIC BLOOD PRESSURE: 102 MMHG | BODY MASS INDEX: 26 KG/M2

## 2019-08-13 DIAGNOSIS — E08.65 DIABETES MELLITUS DUE TO UNDERLYING CONDITION, UNCONTROLLED, WITH HYPERGLYCEMIA (HCC): Primary | ICD-10-CM

## 2019-08-13 PROCEDURE — 99214 OFFICE O/P EST MOD 30 MIN: CPT | Performed by: FAMILY MEDICINE

## 2019-08-13 NOTE — PROGRESS NOTES
Had the deaths of 2 sisters in [facility]  One of anemia  One comps to diabetes. sugasr still high  hba1c >10    Eating less      Patient denies problems with their medication. Denies ulcers, chest pain, dyspnea on exertion.     Ht rrr no M no S3 S4  Lung c

## 2019-08-23 RX ORDER — LANCETS
EACH MISCELLANEOUS
Qty: 100 EACH | Refills: 3 | Status: SHIPPED | OUTPATIENT
Start: 2019-08-23 | End: 2020-08-28

## 2019-08-23 NOTE — TELEPHONE ENCOUNTER
Refill passed per [facility] protocol.   Refill Protocol Appointment Criteria  · Appointment scheduled in the past 12 months or in the next 3 months  Recent Outpatient Visits            1 week ago Diabetes mellitus due to underlying condition, uncontro

## 2019-09-27 ENCOUNTER — OFFICE VISIT (OUTPATIENT)
Dept: FAMILY MEDICINE CLINIC | Facility: CLINIC | Age: 49
End: 2019-09-27
Payer: COMMERCIAL

## 2019-09-27 VITALS
WEIGHT: 150 LBS | TEMPERATURE: 98 F | DIASTOLIC BLOOD PRESSURE: 68 MMHG | HEART RATE: 67 BPM | BODY MASS INDEX: 27 KG/M2 | SYSTOLIC BLOOD PRESSURE: 112 MMHG

## 2019-09-27 DIAGNOSIS — E11.9 DIABETES MELLITUS TYPE 2 IN NONOBESE (HCC): Primary | ICD-10-CM

## 2019-09-27 DIAGNOSIS — Z23 ENCOUNTER FOR IMMUNIZATION: ICD-10-CM

## 2019-09-27 DIAGNOSIS — Z12.31 VISIT FOR SCREENING MAMMOGRAM: ICD-10-CM

## 2019-09-27 PROCEDURE — 90471 IMMUNIZATION ADMIN: CPT | Performed by: FAMILY MEDICINE

## 2019-09-27 PROCEDURE — 99214 OFFICE O/P EST MOD 30 MIN: CPT | Performed by: FAMILY MEDICINE

## 2019-09-27 PROCEDURE — 90686 IIV4 VACC NO PRSV 0.5 ML IM: CPT | Performed by: FAMILY MEDICINE

## 2019-09-27 NOTE — PROGRESS NOTES
Diabetic Visit  My sugars are much better. Patient's blood sugars in the morning are 122 115 118 1/21/2018 131 and 116   2 hours postmeal 181 163 184. No problems with the new medicine.   Labs due October 13, 2019    Patient denies problems with their med

## 2019-10-19 ENCOUNTER — LAB ENCOUNTER (OUTPATIENT)
Dept: LAB | Age: 49
End: 2019-10-19
Attending: FAMILY MEDICINE
Payer: COMMERCIAL

## 2019-10-19 DIAGNOSIS — E08.65 DIABETES MELLITUS DUE TO UNDERLYING CONDITION, UNCONTROLLED, WITH HYPERGLYCEMIA (HCC): ICD-10-CM

## 2019-10-19 PROCEDURE — 82570 ASSAY OF URINE CREATININE: CPT

## 2019-10-19 PROCEDURE — 80053 COMPREHEN METABOLIC PANEL: CPT

## 2019-10-19 PROCEDURE — 36415 COLL VENOUS BLD VENIPUNCTURE: CPT

## 2019-10-19 PROCEDURE — 82043 UR ALBUMIN QUANTITATIVE: CPT

## 2019-10-19 PROCEDURE — 80061 LIPID PANEL: CPT

## 2019-10-19 PROCEDURE — 83036 HEMOGLOBIN GLYCOSYLATED A1C: CPT

## 2019-10-22 NOTE — PROGRESS NOTES
[facility], states that patient just picked up the Lipitor on 10/3/19 and been picking it up monthly, when this nurse asked the name of the staff and the present dose of the Lipitor,,states that whatever on our file is the dose and  she just

## 2019-10-25 NOTE — TELEPHONE ENCOUNTER
Patient stopped by to drop off name of depression med she's on. Per bottle:  Atorvastatin Calcium 40mg tabs  Taking 1 tablet everyday by mouth

## 2019-10-25 NOTE — TELEPHONE ENCOUNTER
pt stated that she has been taking Atorvastatin 40 mg every day as it was prescribed by . Pt will like for you to give her refills. Pt does not want you to increase the dose. See pended medication for your review and approval.    Note

## 2019-10-26 RX ORDER — ATORVASTATIN CALCIUM 40 MG/1
40 TABLET, FILM COATED ORAL NIGHTLY
Qty: 90 TABLET | Refills: 1 | Status: SHIPPED | OUTPATIENT
Start: 2019-10-26 | End: 2020-05-02

## 2019-10-29 DIAGNOSIS — E11.9 TYPE 2 DIABETES MELLITUS WITHOUT COMPLICATION, WITHOUT LONG-TERM CURRENT USE OF INSULIN (HCC): ICD-10-CM

## 2019-10-31 RX ORDER — METFORMIN HYDROCHLORIDE 500 MG/1
500 TABLET, EXTENDED RELEASE ORAL
Qty: 30 TABLET | Refills: 2 | OUTPATIENT
Start: 2019-10-31 | End: 2020-10-30

## 2019-11-02 ENCOUNTER — HOSPITAL ENCOUNTER (OUTPATIENT)
Dept: MAMMOGRAPHY | Age: 49
Discharge: HOME | End: 2019-11-02
Attending: FAMILY MEDICINE
Payer: COMMERCIAL

## 2019-11-02 DIAGNOSIS — Z12.31 VISIT FOR SCREENING MAMMOGRAM: ICD-10-CM

## 2019-11-02 PROCEDURE — 77063 BREAST TOMOSYNTHESIS BI: CPT | Performed by: FAMILY MEDICINE

## 2019-11-02 PROCEDURE — 77067 SCR MAMMO BI INCL CAD: CPT | Performed by: FAMILY MEDICINE

## 2019-12-27 DIAGNOSIS — E11.9 TYPE 2 DIABETES MELLITUS WITHOUT COMPLICATION, WITHOUT LONG-TERM CURRENT USE OF INSULIN (HCC): ICD-10-CM

## 2019-12-27 RX ORDER — LISINOPRIL 5 MG/1
TABLET ORAL
Qty: 90 TABLET | Refills: 1 | Status: SHIPPED | OUTPATIENT
Start: 2019-12-27 | End: 2020-05-22

## 2019-12-27 NOTE — TELEPHONE ENCOUNTER
Refill passed per [facility] protocol.   Hypertensive Medications  Protocol Criteria:  · Appointment scheduled in the past 6 months or in the next 3 months  · BMP or CMP in the past 12 months  · Creatinine result < 2  Recent Outpatient Visits

## 2020-03-18 ENCOUNTER — APPOINTMENT (OUTPATIENT)
Dept: LAB | Age: 50
End: 2020-03-18
Attending: FAMILY MEDICINE
Payer: COMMERCIAL

## 2020-03-18 DIAGNOSIS — IMO0001 UNCONTROLLED DIABETES MELLITUS TYPE 2 WITHOUT COMPLICATIONS: ICD-10-CM

## 2020-03-18 LAB
ALBUMIN SERPL-MCNC: 4.1 G/DL (ref 3.4–5)
ALBUMIN/GLOB SERPL: 1.2 {RATIO} (ref 1–2)
ALP LIVER SERPL-CCNC: 75 U/L (ref 39–100)
ALT SERPL-CCNC: 56 U/L (ref 13–56)
ANION GAP SERPL CALC-SCNC: 6 MMOL/L (ref 0–18)
AST SERPL-CCNC: 22 U/L (ref 15–37)
BILIRUB SERPL-MCNC: 0.9 MG/DL (ref 0.1–2)
BUN BLD-MCNC: 12 MG/DL (ref 7–18)
BUN/CREAT SERPL: 17.4 (ref 10–20)
CALCIUM BLD-MCNC: 9.4 MG/DL (ref 8.5–10.1)
CHLORIDE SERPL-SCNC: 105 MMOL/L (ref 98–112)
CHOLEST SMN-MCNC: 151 MG/DL (ref ?–200)
CO2 SERPL-SCNC: 26 MMOL/L (ref 21–32)
CREAT BLD-MCNC: 0.69 MG/DL (ref 0.55–1.02)
CREAT UR-SCNC: 16.6 MG/DL
EST. AVERAGE GLUCOSE BLD GHB EST-MCNC: 183 MG/DL (ref 68–126)
GLOBULIN PLAS-MCNC: 3.4 G/DL (ref 2.8–4.4)
GLUCOSE BLD-MCNC: 118 MG/DL (ref 70–99)
HBA1C MFR BLD HPLC: 8 % (ref ?–5.7)
HDLC SERPL-MCNC: 36 MG/DL (ref 40–59)
LDLC SERPL CALC-MCNC: 70 MG/DL (ref ?–100)
M PROTEIN MFR SERPL ELPH: 7.5 G/DL (ref 6.4–8.2)
MICROALBUMIN UR-MCNC: <0.5 MG/DL
NONHDLC SERPL-MCNC: 115 MG/DL (ref ?–130)
OSMOLALITY SERPL CALC.SUM OF ELEC: 285 MOSM/KG (ref 275–295)
PATIENT FASTING Y/N/NP: YES
PATIENT FASTING Y/N/NP: YES
POTASSIUM SERPL-SCNC: 3.7 MMOL/L (ref 3.5–5.1)
SODIUM SERPL-SCNC: 137 MMOL/L (ref 136–145)
TRIGL SERPL-MCNC: 224 MG/DL (ref 30–149)
VLDLC SERPL CALC-MCNC: 45 MG/DL (ref 0–30)

## 2020-03-18 PROCEDURE — 83036 HEMOGLOBIN GLYCOSYLATED A1C: CPT

## 2020-03-18 PROCEDURE — 80061 LIPID PANEL: CPT

## 2020-03-18 PROCEDURE — 82570 ASSAY OF URINE CREATININE: CPT

## 2020-03-18 PROCEDURE — 80053 COMPREHEN METABOLIC PANEL: CPT

## 2020-03-18 PROCEDURE — 82043 UR ALBUMIN QUANTITATIVE: CPT

## 2020-03-18 PROCEDURE — 36415 COLL VENOUS BLD VENIPUNCTURE: CPT

## 2020-05-02 RX ORDER — ATORVASTATIN CALCIUM 40 MG/1
40 TABLET, FILM COATED ORAL NIGHTLY
Qty: 90 TABLET | Refills: 0 | Status: SHIPPED | OUTPATIENT
Start: 2020-05-02 | End: 2020-07-28

## 2020-05-20 ENCOUNTER — TELEPHONE (OUTPATIENT)
Dept: FAMILY MEDICINE CLINIC | Facility: CLINIC | Age: 50
End: 2020-05-20

## 2020-05-20 NOTE — TELEPHONE ENCOUNTER
Pt is scheduled to see Dr John Garcia Sat 5/23 at 56. Called patient, vm left with  to call back     Is pt able to reschedule appt for tomorrow or Friday?  If so please reschedule

## 2020-05-22 ENCOUNTER — LAB ENCOUNTER (OUTPATIENT)
Dept: LAB | Age: 50
End: 2020-05-22
Attending: FAMILY MEDICINE
Payer: COMMERCIAL

## 2020-05-22 ENCOUNTER — OFFICE VISIT (OUTPATIENT)
Dept: FAMILY MEDICINE CLINIC | Facility: CLINIC | Age: 50
End: 2020-05-22
Payer: COMMERCIAL

## 2020-05-22 VITALS
HEIGHT: 63 IN | DIASTOLIC BLOOD PRESSURE: 73 MMHG | SYSTOLIC BLOOD PRESSURE: 110 MMHG | TEMPERATURE: 98 F | BODY MASS INDEX: 28.35 KG/M2 | HEART RATE: 79 BPM | WEIGHT: 160 LBS

## 2020-05-22 DIAGNOSIS — J30.89 OTHER ALLERGIC RHINITIS: ICD-10-CM

## 2020-05-22 DIAGNOSIS — E11.65 UNCONTROLLED TYPE 2 DIABETES MELLITUS WITH HYPERGLYCEMIA (HCC): Primary | ICD-10-CM

## 2020-05-22 DIAGNOSIS — E11.65 UNCONTROLLED TYPE 2 DIABETES MELLITUS WITH HYPERGLYCEMIA (HCC): ICD-10-CM

## 2020-05-22 DIAGNOSIS — E78.2 MIXED HYPERLIPIDEMIA: ICD-10-CM

## 2020-05-22 DIAGNOSIS — Z12.4 CERVICAL CANCER SCREENING: ICD-10-CM

## 2020-05-22 PROCEDURE — 83036 HEMOGLOBIN GLYCOSYLATED A1C: CPT

## 2020-05-22 PROCEDURE — 36415 COLL VENOUS BLD VENIPUNCTURE: CPT

## 2020-05-22 PROCEDURE — 99214 OFFICE O/P EST MOD 30 MIN: CPT | Performed by: FAMILY MEDICINE

## 2020-05-22 PROCEDURE — 85025 COMPLETE CBC W/AUTO DIFF WBC: CPT

## 2020-05-22 PROCEDURE — 80048 BASIC METABOLIC PNL TOTAL CA: CPT

## 2020-05-22 PROCEDURE — 84443 ASSAY THYROID STIM HORMONE: CPT

## 2020-05-22 RX ORDER — LISINOPRIL 5 MG/1
5 TABLET ORAL DAILY
Qty: 90 TABLET | Refills: 1 | Status: SHIPPED | OUTPATIENT
Start: 2020-05-22 | End: 2020-08-17

## 2020-05-22 RX ORDER — FLUTICASONE PROPIONATE 50 MCG
2 SPRAY, SUSPENSION (ML) NASAL DAILY
Qty: 3 BOTTLE | Refills: 1 | Status: SHIPPED | OUTPATIENT
Start: 2020-05-22 | End: 2020-09-19

## 2020-05-22 NOTE — PROGRESS NOTES
Patient ID: Carlos Hernandez is a 52year old female. HPI  Patient presents with:  Diabetes: follow up    Last seen by me in 2016. Pt normally sees Dr. Min Chavez. She is  and does not smoke. 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      I reviewe 03/18/2020    BUN 12 03/18/2020    BUNCREA 17.4 03/18/2020    CREATSERUM 0.69 03/18/2020    ANIONGAP 6 03/18/2020    GFRNAA 103 03/18/2020    GFRAA 119 03/18/2020    CA 9.4 03/18/2020    OSMOCALC 285 03/18/2020    ALKPHO 75 03/18/2020    AST 22 03/18/2020 kg/m²  02/02/19 : 27.81 kg/m²  07/24/18 : 27.63 kg/m²      BP Readings from Last 6 Encounters:  05/22/20 : 110/73  09/27/19 : 112/68  08/13/19 : 102/66  08/03/19 : 99/64  02/02/19 : 103/66  07/24/18 : 110/64        Review of Systems   Constitutional: Negat Vitamins-Minerals (WOMENS MULTIVITAMIN PLUS) Oral Tab Take 1 tablet by mouth daily. Allergies:No Known Allergies     Physical Exam:       Physical Exam   Physical Exam   Constitutional: Patient is oriented to person, place, and time.  Patient appears near 6.5. Other allergic rhinitis  -     Fluticasone Propionate 50 MCG/ACT Nasal Suspension; 2 sprays by Nasal route daily. Squeeze nose after sprays. Do not snort into the back of the throat.   Flonase refill as it does work for her  Mixed hyperlipidemia described in this documentation. All medical record entries made by the scribe were at my direction and in my presence.   I have reviewed the chart and discharge instructions (if applicable) and agree that the record reflects my personal performance and is

## 2020-05-26 ENCOUNTER — TELEPHONE (OUTPATIENT)
Dept: FAMILY MEDICINE CLINIC | Facility: CLINIC | Age: 50
End: 2020-05-26

## 2020-05-26 NOTE — TELEPHONE ENCOUNTER
Notes recorded by MAGDALENA Moncada on 5/26/2020 at 10:01 AM CDT  LMTCB  ------    Notes recorded by Rohit Lora DO on 5/24/2020 at 9:55 PM CDT  CBC shows no anemia.  Your thyroid test is normal. Maulik Shan function is normal.  Unfortunately diabetes is n

## 2020-06-01 ENCOUNTER — OFFICE VISIT (OUTPATIENT)
Dept: OBGYN CLINIC | Facility: CLINIC | Age: 50
End: 2020-06-01
Payer: COMMERCIAL

## 2020-06-01 VITALS
DIASTOLIC BLOOD PRESSURE: 74 MMHG | BODY MASS INDEX: 28 KG/M2 | HEART RATE: 77 BPM | SYSTOLIC BLOOD PRESSURE: 112 MMHG | WEIGHT: 158 LBS

## 2020-06-01 DIAGNOSIS — Z12.31 SCREENING MAMMOGRAM, ENCOUNTER FOR: ICD-10-CM

## 2020-06-01 DIAGNOSIS — N81.6 RECTOCELE: ICD-10-CM

## 2020-06-01 DIAGNOSIS — Z01.419 WELL WOMAN EXAM WITH ROUTINE GYNECOLOGICAL EXAM: Primary | ICD-10-CM

## 2020-06-01 DIAGNOSIS — N81.11 CYSTOCELE, MIDLINE: ICD-10-CM

## 2020-06-01 PROCEDURE — 99396 PREV VISIT EST AGE 40-64: CPT | Performed by: OBSTETRICS & GYNECOLOGY

## 2020-06-01 NOTE — PROGRESS NOTES
HPI:    Patient ID: Dajuan Cm is a 52year old year old female. HPI  New patient  Well woman visit. History of abdominal hysterectomy and unilateral oophorectomy. Feels occasional hot flashes occasional vaginal dryness.   Notes stress urinary No cervical adenopathy. Breasts: Symmetric. Skin without lesions. No masses. Areolae are normal.  Nipples without discharge. No axillary or supra cervical adenopathy    Abdominal: Soft.  Normal appearance and bowel sounds are normal. She exhibits no metformin, Januvia, Victoza and Saxenda in the past.  Hemoglobin A1c uncontrolled. , Disp: 60 tablet, Rfl: 2  Fluticasone Propionate 50 MCG/ACT Nasal Suspension, 2 sprays by Nasal route daily. Squeeze nose after sprays.   Do not snort into the back of the th

## 2020-07-28 RX ORDER — ATORVASTATIN CALCIUM 40 MG/1
TABLET, FILM COATED ORAL
Qty: 90 TABLET | Refills: 0 | Status: SHIPPED | OUTPATIENT
Start: 2020-07-28 | End: 2020-08-17

## 2020-07-28 RX ORDER — ASPIRIN 81 MG/1
81 TABLET ORAL DAILY
Qty: 90 TABLET | Refills: 11 | Status: SHIPPED | OUTPATIENT
Start: 2020-07-28 | End: 2020-07-29

## 2020-07-28 NOTE — TELEPHONE ENCOUNTER
Pharmacy calling to follow up on refill request. Pharmacy states that it was sent over this morning (encounter not found). Please advise.        Current Outpatient Medications:   •  ASPIR-LOW 81 MG Oral Tab EC, TAKE ONE TABLET BY MOUTH ONE TIME DAILY ,

## 2020-07-29 RX ORDER — ASPIRIN 81 MG/1
81 TABLET ORAL DAILY
Qty: 90 TABLET | Refills: 3 | Status: SHIPPED | OUTPATIENT
Start: 2020-07-29 | End: 2020-08-17

## 2020-08-15 DIAGNOSIS — E11.65 UNCONTROLLED TYPE 2 DIABETES MELLITUS WITH HYPERGLYCEMIA (HCC): ICD-10-CM

## 2020-08-15 NOTE — TELEPHONE ENCOUNTER
Go ahead and pend the medications for me but let her know that she had labs that were due July 5 as her diabetes was not controlled. She still needs to get these done and then we need to have her follow-up with me. She needs to take the diabetes very seriously otherwise she will need to find a different provider.

## 2020-08-17 RX ORDER — ATORVASTATIN CALCIUM 40 MG/1
40 TABLET, FILM COATED ORAL NIGHTLY
Qty: 90 TABLET | Refills: 0 | Status: SHIPPED | OUTPATIENT
Start: 2020-08-17 | End: 2020-11-27

## 2020-08-17 RX ORDER — ASPIRIN 81 MG/1
81 TABLET ORAL DAILY
Qty: 90 TABLET | Refills: 0 | Status: SHIPPED | OUTPATIENT
Start: 2020-08-17 | End: 2020-11-22

## 2020-08-17 RX ORDER — LISINOPRIL 5 MG/1
5 TABLET ORAL DAILY
Qty: 90 TABLET | Refills: 0 | Status: SHIPPED | OUTPATIENT
Start: 2020-08-17 | End: 2020-11-27

## 2020-08-17 NOTE — TELEPHONE ENCOUNTER
Medication pend, also called and left voicemail for patient to call back, she needs labs to do since July 5 and also needs a f/u apt with Dr Ellis Gaytan gómez

## 2020-08-24 ENCOUNTER — LAB ENCOUNTER (OUTPATIENT)
Dept: LAB | Age: 50
End: 2020-08-24
Attending: FAMILY MEDICINE
Payer: COMMERCIAL

## 2020-08-24 DIAGNOSIS — E11.65 UNCONTROLLED TYPE 2 DIABETES MELLITUS WITH HYPERGLYCEMIA (HCC): ICD-10-CM

## 2020-08-24 LAB
ANION GAP SERPL CALC-SCNC: 9 MMOL/L (ref 0–18)
BUN BLD-MCNC: 13 MG/DL (ref 7–18)
BUN/CREAT SERPL: 15.7 (ref 10–20)
CALCIUM BLD-MCNC: 9.5 MG/DL (ref 8.5–10.1)
CHLORIDE SERPL-SCNC: 105 MMOL/L (ref 98–112)
CO2 SERPL-SCNC: 24 MMOL/L (ref 21–32)
CREAT BLD-MCNC: 0.83 MG/DL (ref 0.55–1.02)
EST. AVERAGE GLUCOSE BLD GHB EST-MCNC: 174 MG/DL (ref 68–126)
GLUCOSE BLD-MCNC: 100 MG/DL (ref 70–99)
HBA1C MFR BLD HPLC: 7.7 % (ref ?–5.7)
OSMOLALITY SERPL CALC.SUM OF ELEC: 286 MOSM/KG (ref 275–295)
PATIENT FASTING Y/N/NP: YES
POTASSIUM SERPL-SCNC: 3.8 MMOL/L (ref 3.5–5.1)
SODIUM SERPL-SCNC: 138 MMOL/L (ref 136–145)

## 2020-08-24 PROCEDURE — 80048 BASIC METABOLIC PNL TOTAL CA: CPT

## 2020-08-24 PROCEDURE — 36415 COLL VENOUS BLD VENIPUNCTURE: CPT

## 2020-08-24 PROCEDURE — 83036 HEMOGLOBIN GLYCOSYLATED A1C: CPT

## 2020-08-25 ENCOUNTER — TELEPHONE (OUTPATIENT)
Dept: FAMILY MEDICINE CLINIC | Facility: CLINIC | Age: 50
End: 2020-08-25

## 2020-08-25 DIAGNOSIS — E11.65 UNCONTROLLED TYPE 2 DIABETES MELLITUS WITH HYPERGLYCEMIA (HCC): ICD-10-CM

## 2020-08-25 NOTE — TELEPHONE ENCOUNTER
Lisa from [address] [place] [address] stated patient needs a refill on    Canagliflozin-metFORMIN HCl ER (INVOKAMET XR)  MG Oral Tablet 24 Hr () 60 tablet 2 2020   Sig:   Take 2 tablets by mouth daily.  Failed on metformin, Januvia, Victoza and Saxend

## 2020-08-26 RX ORDER — CANAGLIFLOZIN AND METFORMIN HYDROCHLORIDE 50; 1000 MG/1; MG/1
TABLET, FILM COATED, EXTENDED RELEASE ORAL
Qty: 60 TABLET | Refills: 0 | Status: SHIPPED | OUTPATIENT
Start: 2020-08-26 | End: 2020-09-11

## 2020-08-28 RX ORDER — LANCETS
EACH MISCELLANEOUS
Qty: 100 EACH | Refills: 3 | Status: SHIPPED | OUTPATIENT
Start: 2020-08-28 | End: 2021-10-19

## 2020-08-28 RX ORDER — BLOOD SUGAR DIAGNOSTIC
STRIP MISCELLANEOUS
Qty: 100 STRIP | Refills: 3 | Status: SHIPPED | OUTPATIENT
Start: 2020-08-28

## 2020-08-28 NOTE — TELEPHONE ENCOUNTER
Patient out of meds, needs ACCU-CHEK SOFTCLIX LANCETS  ACCU-CHEK ALY PLUS In Vitro Strip     Patient is completely out of supplies.  Has not seen Bruno Oseguera in 1 year, sent to [employer] per [place] office staff

## 2020-08-28 NOTE — TELEPHONE ENCOUNTER
Pt has scheduled appt with Alton Ceron for 9/11/20      Please advise if can send script for  testing strip and lancets.     Pended    Please advise

## 2020-09-11 ENCOUNTER — OFFICE VISIT (OUTPATIENT)
Dept: FAMILY MEDICINE CLINIC | Facility: CLINIC | Age: 50
End: 2020-09-11
Payer: COMMERCIAL

## 2020-09-11 VITALS
HEIGHT: 63 IN | WEIGHT: 152 LBS | DIASTOLIC BLOOD PRESSURE: 74 MMHG | TEMPERATURE: 98 F | BODY MASS INDEX: 26.93 KG/M2 | SYSTOLIC BLOOD PRESSURE: 104 MMHG | HEART RATE: 71 BPM

## 2020-09-11 DIAGNOSIS — Z23 NEED FOR VACCINATION: ICD-10-CM

## 2020-09-11 DIAGNOSIS — E11.65 UNCONTROLLED TYPE 2 DIABETES MELLITUS WITH HYPERGLYCEMIA (HCC): Primary | ICD-10-CM

## 2020-09-11 PROCEDURE — 3078F DIAST BP <80 MM HG: CPT | Performed by: FAMILY MEDICINE

## 2020-09-11 PROCEDURE — 90471 IMMUNIZATION ADMIN: CPT | Performed by: FAMILY MEDICINE

## 2020-09-11 PROCEDURE — 90686 IIV4 VACC NO PRSV 0.5 ML IM: CPT | Performed by: FAMILY MEDICINE

## 2020-09-11 PROCEDURE — 99214 OFFICE O/P EST MOD 30 MIN: CPT | Performed by: FAMILY MEDICINE

## 2020-09-11 PROCEDURE — 3008F BODY MASS INDEX DOCD: CPT | Performed by: FAMILY MEDICINE

## 2020-09-11 PROCEDURE — 3074F SYST BP LT 130 MM HG: CPT | Performed by: FAMILY MEDICINE

## 2020-09-11 NOTE — PROGRESS NOTES
Patient ID: Damien Alston is a 52year old female. HPI  Patient presents with: Follow - Up: Diabetes    Last seen by me on 5/22/2020. 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  She states that she has intermittent pain i BUNCREA 15.7 08/24/2020    ANIONGAP 9 08/24/2020    GFRAA 96 08/24/2020    GFRNAA 84 08/24/2020    CA 9.5 08/24/2020     08/24/2020    K 3.8 08/24/2020     08/24/2020    CO2 24.0 08/24/2020    OSMOCALC 286 08/24/2020       Lab Results   Comp ([facility]    • High blood pressure    • High cholesterol    • Lipid screening 10/19/2013    per NG   • Type 2 diabetes mellitus without complication, without long-term current use of insulin ([facility] 6/29/2017   • Uterine fibroid 2016    lupron injections. distress. Lymphadenopathy: Patient has no cervical adenopathy. Neurological: Patient is alert and oriented to person, place, and time. 1/4 DTR BLE. Lower legs: No edema of the legs bilaterally. Diabetic feet. No ulcerations. Skin: Skin is warm.    Psyc Specialty:OPHTHALMOLOGY          Number of Visits Requested:2      Follow up if symptoms persist.  Take medicine (if given) as prescribed. Approach to treatment discussed and patient/family member understands and agrees to plan. No follow-ups on file.

## 2020-09-11 NOTE — PATIENT INSTRUCTIONS
When you finish the 50/1000 mg of Invokamet XR you will switch over to be 150/1000 mg of Invokamet XR. Take 2 each day at the same time. This should help with your diabetes.   I will have the labs written for you then in 2 months and you can do them fasti

## 2020-09-22 DIAGNOSIS — E11.65 UNCONTROLLED TYPE 2 DIABETES MELLITUS WITH HYPERGLYCEMIA (HCC): ICD-10-CM

## 2020-09-22 RX ORDER — CANAGLIFLOZIN AND METFORMIN HYDROCHLORIDE 50; 1000 MG/1; MG/1
TABLET, FILM COATED, EXTENDED RELEASE ORAL
Qty: 60 TABLET | Refills: 0 | OUTPATIENT
Start: 2020-09-22

## 2020-11-16 ENCOUNTER — LAB ENCOUNTER (OUTPATIENT)
Dept: LAB | Age: 50
End: 2020-11-16
Attending: FAMILY MEDICINE
Payer: COMMERCIAL

## 2020-11-16 DIAGNOSIS — E11.65 UNCONTROLLED TYPE 2 DIABETES MELLITUS WITH HYPERGLYCEMIA (HCC): ICD-10-CM

## 2020-11-16 PROCEDURE — 80048 BASIC METABOLIC PNL TOTAL CA: CPT

## 2020-11-16 PROCEDURE — 36415 COLL VENOUS BLD VENIPUNCTURE: CPT

## 2020-11-16 PROCEDURE — 83036 HEMOGLOBIN GLYCOSYLATED A1C: CPT

## 2020-11-20 ENCOUNTER — OFFICE VISIT (OUTPATIENT)
Dept: FAMILY MEDICINE CLINIC | Facility: CLINIC | Age: 50
End: 2020-11-20
Payer: COMMERCIAL

## 2020-11-20 VITALS
SYSTOLIC BLOOD PRESSURE: 92 MMHG | WEIGHT: 149 LBS | HEART RATE: 89 BPM | DIASTOLIC BLOOD PRESSURE: 60 MMHG | BODY MASS INDEX: 26.4 KG/M2 | HEIGHT: 63 IN | TEMPERATURE: 98 F

## 2020-11-20 DIAGNOSIS — E11.65 UNCONTROLLED TYPE 2 DIABETES MELLITUS WITH HYPERGLYCEMIA (HCC): Primary | ICD-10-CM

## 2020-11-20 DIAGNOSIS — Z12.31 ENCOUNTER FOR SCREENING MAMMOGRAM FOR MALIGNANT NEOPLASM OF BREAST: ICD-10-CM

## 2020-11-20 PROCEDURE — 3008F BODY MASS INDEX DOCD: CPT | Performed by: FAMILY MEDICINE

## 2020-11-20 PROCEDURE — 3078F DIAST BP <80 MM HG: CPT | Performed by: FAMILY MEDICINE

## 2020-11-20 PROCEDURE — 99214 OFFICE O/P EST MOD 30 MIN: CPT | Performed by: FAMILY MEDICINE

## 2020-11-20 PROCEDURE — 99072 ADDL SUPL MATRL&STAF TM PHE: CPT | Performed by: FAMILY MEDICINE

## 2020-11-20 PROCEDURE — 3074F SYST BP LT 130 MM HG: CPT | Performed by: FAMILY MEDICINE

## 2020-11-20 NOTE — PROGRESS NOTES
Patient presents with: Follow - Up: DM     HPI:   Efe Steiner is a 52year old female who presents to clinic for DM f/u.   Diagnosed with Diabetes:      Prior HbA1C: 7.1%  Dietary compliance: fair  Exercise: has exercise bike and walks  Polyuria/po murmur    ASSESSMENT AND PLAN:   1. Uncontrolled type 2 diabetes mellitus with hyperglycemia ([facility]  -Recent blood work reviewed with patient. A1c has improved with increased dose of medication. Continue current medication regimen.   Can repeat labs in 3 m

## 2020-11-22 RX ORDER — ASPIRIN 81 MG/1
81 TABLET ORAL DAILY
Qty: 90 TABLET | Refills: 3 | Status: SHIPPED | OUTPATIENT
Start: 2020-11-22 | End: 2022-08-17

## 2020-11-26 DIAGNOSIS — E11.65 UNCONTROLLED TYPE 2 DIABETES MELLITUS WITH HYPERGLYCEMIA (HCC): ICD-10-CM

## 2020-11-27 RX ORDER — ATORVASTATIN CALCIUM 40 MG/1
40 TABLET, FILM COATED ORAL NIGHTLY
Qty: 90 TABLET | Refills: 0 | Status: SHIPPED | OUTPATIENT
Start: 2020-11-27 | End: 2021-02-27

## 2020-11-27 RX ORDER — LISINOPRIL 5 MG/1
5 TABLET ORAL DAILY
Qty: 90 TABLET | Refills: 0 | Status: SHIPPED | OUTPATIENT
Start: 2020-11-27 | End: 2021-02-27

## 2021-01-19 ENCOUNTER — HOSPITAL ENCOUNTER (OUTPATIENT)
Dept: MAMMOGRAPHY | Age: 51
Discharge: HOME | End: 2021-01-19
Attending: FAMILY MEDICINE
Payer: COMMERCIAL

## 2021-01-19 DIAGNOSIS — Z12.31 ENCOUNTER FOR SCREENING MAMMOGRAM FOR MALIGNANT NEOPLASM OF BREAST: ICD-10-CM

## 2021-01-19 PROCEDURE — 77063 BREAST TOMOSYNTHESIS BI: CPT | Performed by: FAMILY MEDICINE

## 2021-01-19 PROCEDURE — 77067 SCR MAMMO BI INCL CAD: CPT | Performed by: FAMILY MEDICINE

## 2021-02-20 ENCOUNTER — LAB ENCOUNTER (OUTPATIENT)
Dept: LAB | Age: 51
End: 2021-02-20
Attending: FAMILY MEDICINE
Payer: COMMERCIAL

## 2021-02-20 DIAGNOSIS — E11.65 UNCONTROLLED TYPE 2 DIABETES MELLITUS WITH HYPERGLYCEMIA (HCC): ICD-10-CM

## 2021-02-20 LAB
ALBUMIN SERPL-MCNC: 4.2 G/DL (ref 3.4–5)
ALBUMIN/GLOB SERPL: 1.2 {RATIO} (ref 1–2)
ALP LIVER SERPL-CCNC: 73 U/L
ALT SERPL-CCNC: 53 U/L
ANION GAP SERPL CALC-SCNC: 7 MMOL/L (ref 0–18)
AST SERPL-CCNC: 26 U/L (ref 15–37)
BILIRUB SERPL-MCNC: 1.1 MG/DL (ref 0.1–2)
BUN BLD-MCNC: 15 MG/DL (ref 7–18)
BUN/CREAT SERPL: 20.3 (ref 10–20)
CALCIUM BLD-MCNC: 9.5 MG/DL (ref 8.5–10.1)
CHLORIDE SERPL-SCNC: 106 MMOL/L (ref 98–112)
CHOLEST SMN-MCNC: 136 MG/DL (ref ?–200)
CO2 SERPL-SCNC: 27 MMOL/L (ref 21–32)
CREAT BLD-MCNC: 0.74 MG/DL
CREAT UR-SCNC: 64.9 MG/DL
EST. AVERAGE GLUCOSE BLD GHB EST-MCNC: 154 MG/DL (ref 68–126)
GLOBULIN PLAS-MCNC: 3.6 G/DL (ref 2.8–4.4)
GLUCOSE BLD-MCNC: 104 MG/DL (ref 70–99)
HBA1C MFR BLD HPLC: 7 % (ref ?–5.7)
HDLC SERPL-MCNC: 41 MG/DL (ref 40–59)
LDLC SERPL CALC-MCNC: 55 MG/DL (ref ?–100)
M PROTEIN MFR SERPL ELPH: 7.8 G/DL (ref 6.4–8.2)
MICROALBUMIN UR-MCNC: 0.7 MG/DL
MICROALBUMIN/CREAT 24H UR-RTO: 10.8 UG/MG (ref ?–30)
NONHDLC SERPL-MCNC: 95 MG/DL (ref ?–130)
OSMOLALITY SERPL CALC.SUM OF ELEC: 291 MOSM/KG (ref 275–295)
PATIENT FASTING Y/N/NP: YES
PATIENT FASTING Y/N/NP: YES
POTASSIUM SERPL-SCNC: 4.1 MMOL/L (ref 3.5–5.1)
SODIUM SERPL-SCNC: 140 MMOL/L (ref 136–145)
TRIGL SERPL-MCNC: 199 MG/DL (ref 30–149)
VLDLC SERPL CALC-MCNC: 40 MG/DL (ref 0–30)

## 2021-02-20 PROCEDURE — 82570 ASSAY OF URINE CREATININE: CPT

## 2021-02-20 PROCEDURE — 80053 COMPREHEN METABOLIC PANEL: CPT

## 2021-02-20 PROCEDURE — 3061F NEG MICROALBUMINURIA REV: CPT | Performed by: FAMILY MEDICINE

## 2021-02-20 PROCEDURE — 80061 LIPID PANEL: CPT

## 2021-02-20 PROCEDURE — 82043 UR ALBUMIN QUANTITATIVE: CPT

## 2021-02-20 PROCEDURE — 83036 HEMOGLOBIN GLYCOSYLATED A1C: CPT

## 2021-02-20 PROCEDURE — 36415 COLL VENOUS BLD VENIPUNCTURE: CPT

## 2021-02-24 ENCOUNTER — TELEPHONE (OUTPATIENT)
Dept: FAMILY MEDICINE CLINIC | Facility: CLINIC | Age: 51
End: 2021-02-24

## 2021-02-24 NOTE — TELEPHONE ENCOUNTER
Patient [facility])  returned called [facility] verified. Celia Latham verbalizes understanding and no RX needed at this time. Patient has no further questions.

## 2021-02-27 ENCOUNTER — OFFICE VISIT (OUTPATIENT)
Dept: FAMILY MEDICINE CLINIC | Facility: CLINIC | Age: 51
End: 2021-02-27
Payer: COMMERCIAL

## 2021-02-27 ENCOUNTER — HOSPITAL ENCOUNTER (OUTPATIENT)
Dept: GENERAL RADIOLOGY | Age: 51
Discharge: HOME | End: 2021-02-27
Attending: FAMILY MEDICINE
Payer: COMMERCIAL

## 2021-02-27 VITALS
HEIGHT: 63 IN | HEART RATE: 72 BPM | SYSTOLIC BLOOD PRESSURE: 112 MMHG | BODY MASS INDEX: 25.66 KG/M2 | DIASTOLIC BLOOD PRESSURE: 76 MMHG | WEIGHT: 144.81 LBS | TEMPERATURE: 97 F

## 2021-02-27 DIAGNOSIS — E11.69 CONTROLLED TYPE 2 DIABETES MELLITUS WITH OTHER SPECIFIED COMPLICATION, WITHOUT LONG-TERM CURRENT USE OF INSULIN (HCC): ICD-10-CM

## 2021-02-27 DIAGNOSIS — N95.2 VAGINAL ATROPHY: ICD-10-CM

## 2021-02-27 DIAGNOSIS — M79.10 MYALGIA: ICD-10-CM

## 2021-02-27 DIAGNOSIS — Z12.4 CERVICAL CANCER SCREENING: ICD-10-CM

## 2021-02-27 DIAGNOSIS — M79.642 HAND PAIN, LEFT: ICD-10-CM

## 2021-02-27 DIAGNOSIS — M15.1 HEBERDEN'S NODES OF BOTH HANDS: ICD-10-CM

## 2021-02-27 DIAGNOSIS — E11.9 TYPE 2 DIABETES MELLITUS WITHOUT COMPLICATION, WITHOUT LONG-TERM CURRENT USE OF INSULIN (HCC): Primary | ICD-10-CM

## 2021-02-27 DIAGNOSIS — I10 ESSENTIAL HYPERTENSION: ICD-10-CM

## 2021-02-27 DIAGNOSIS — E78.2 MIXED HYPERLIPIDEMIA: ICD-10-CM

## 2021-02-27 PROCEDURE — 3008F BODY MASS INDEX DOCD: CPT | Performed by: FAMILY MEDICINE

## 2021-02-27 PROCEDURE — 3078F DIAST BP <80 MM HG: CPT | Performed by: FAMILY MEDICINE

## 2021-02-27 PROCEDURE — 73130 X-RAY EXAM OF HAND: CPT | Performed by: FAMILY MEDICINE

## 2021-02-27 PROCEDURE — 99214 OFFICE O/P EST MOD 30 MIN: CPT | Performed by: FAMILY MEDICINE

## 2021-02-27 PROCEDURE — 3074F SYST BP LT 130 MM HG: CPT | Performed by: FAMILY MEDICINE

## 2021-02-27 RX ORDER — LISINOPRIL 5 MG/1
5 TABLET ORAL DAILY
COMMUNITY
End: 2021-02-27

## 2021-02-27 RX ORDER — ROSUVASTATIN CALCIUM 10 MG/1
10 TABLET, COATED ORAL NIGHTLY
Qty: 30 TABLET | Refills: 3 | Status: SHIPPED | OUTPATIENT
Start: 2021-02-27 | End: 2021-04-10

## 2021-02-27 RX ORDER — CANAGLIFLOZIN AND METFORMIN HYDROCHLORIDE 150; 1000 MG/1; MG/1
TABLET, FILM COATED, EXTENDED RELEASE ORAL DAILY
Qty: 180 TABLET | Refills: 1 | Status: SHIPPED | OUTPATIENT
Start: 2021-02-27 | End: 2021-04-10

## 2021-02-27 RX ORDER — LISINOPRIL 5 MG/1
5 TABLET ORAL DAILY
Qty: 90 TABLET | Refills: 0 | Status: SHIPPED | OUTPATIENT
Start: 2021-02-27 | End: 2021-06-01

## 2021-02-27 RX ORDER — CANAGLIFLOZIN AND METFORMIN HYDROCHLORIDE 150; 1000 MG/1; MG/1
TABLET, FILM COATED, EXTENDED RELEASE ORAL
COMMUNITY
Start: 2021-02-12 | End: 2021-02-27

## 2021-02-27 NOTE — PROGRESS NOTES
Patient ID: Lalit Nicholas is a 52year old female. HPI  Patient presents with:  Diabetes: f/u    Last seen by me on 9/11/2020. Pt is a caretaker for senior citizens. Pt reports she had her uterus and left ovary removed.  She c/o decreased l 7.8 02/20/2021    ALB 4.2 02/20/2021    GLOBULIN 3.6 02/20/2021    AGRATIO 1.5 01/16/2016     02/20/2021    K 4.1 02/20/2021     02/20/2021    CO2 27.0 02/20/2021       Lab Results   Component Value Date     (H) 02/20/2021    BUN 15 02/2 Dryness in the vagina   Musculoskeletal: Positive for arthralgias.            Medical History:      Past Medical History:   Diagnosis Date   • Diabetes ([facility]    • High blood pressure    • High cholesterol    • Lipid screening 10/19/2013    per NG   • Skin: Skin is warm. Psychiatry: Normal mood and affect. Hands: no synovitis. heberden's node on bilateral index fingers. Flexion deformity at DIP joint of left index finger    Vitals reviewed.            Assessment/Plan:      I am starting the pt on C atrophy with them.   Cervical cancer screening  -     [facility] - INTERNAL        Referrals (if applicable)  Orders Placed This Encounter      [facility] Referral- [facility]          Referral Priority:Routine          Referral Type:OFFICE VISIT          Referred to [facility]

## 2021-02-27 NOTE — PATIENT INSTRUCTIONS
Stop the atorvastatin 40 and start Crestor 10 mg daily. Do labs in 6 weeks so we can see how your cholesterol is. For the vaginal dryness go ahead and try K-Y jelly over-the-counter for intercourse.

## 2021-03-01 ENCOUNTER — TELEPHONE (OUTPATIENT)
Dept: FAMILY MEDICINE CLINIC | Facility: CLINIC | Age: 51
End: 2021-03-01

## 2021-03-01 NOTE — TELEPHONE ENCOUNTER
Called [facility] [phone]. Spoke to Sachi from [employer] who states this is refill too soon. Sachi states patient is not due for a refill yet. Medication was renewed during office visit. Patient did not request medication to be refills.      Disregard re

## 2021-04-05 ENCOUNTER — LAB ENCOUNTER (OUTPATIENT)
Dept: LAB | Age: 51
End: 2021-04-05
Attending: FAMILY MEDICINE
Payer: COMMERCIAL

## 2021-04-05 DIAGNOSIS — E78.2 MIXED HYPERLIPIDEMIA: ICD-10-CM

## 2021-04-05 PROCEDURE — 84450 TRANSFERASE (AST) (SGOT): CPT

## 2021-04-05 PROCEDURE — 80061 LIPID PANEL: CPT

## 2021-04-05 PROCEDURE — 84460 ALANINE AMINO (ALT) (SGPT): CPT

## 2021-04-05 PROCEDURE — 36415 COLL VENOUS BLD VENIPUNCTURE: CPT

## 2021-04-10 ENCOUNTER — LAB ENCOUNTER (OUTPATIENT)
Dept: LAB | Age: 51
End: 2021-04-10
Attending: FAMILY MEDICINE
Payer: COMMERCIAL

## 2021-04-10 ENCOUNTER — OFFICE VISIT (OUTPATIENT)
Dept: FAMILY MEDICINE CLINIC | Facility: CLINIC | Age: 51
End: 2021-04-10
Payer: COMMERCIAL

## 2021-04-10 VITALS
WEIGHT: 143.19 LBS | BODY MASS INDEX: 25.37 KG/M2 | HEIGHT: 63 IN | DIASTOLIC BLOOD PRESSURE: 62 MMHG | HEART RATE: 76 BPM | TEMPERATURE: 98 F | SYSTOLIC BLOOD PRESSURE: 101 MMHG

## 2021-04-10 DIAGNOSIS — J30.89 OTHER ALLERGIC RHINITIS: ICD-10-CM

## 2021-04-10 DIAGNOSIS — E78.2 MIXED HYPERLIPIDEMIA: ICD-10-CM

## 2021-04-10 DIAGNOSIS — H10.13 ALLERGIC CONJUNCTIVITIS OF BOTH EYES: ICD-10-CM

## 2021-04-10 DIAGNOSIS — E11.9 TYPE 2 DIABETES MELLITUS WITHOUT COMPLICATION, WITHOUT LONG-TERM CURRENT USE OF INSULIN (HCC): ICD-10-CM

## 2021-04-10 DIAGNOSIS — J30.89 OTHER ALLERGIC RHINITIS: Primary | ICD-10-CM

## 2021-04-10 PROCEDURE — 86003 ALLG SPEC IGE CRUDE XTRC EA: CPT

## 2021-04-10 PROCEDURE — 3008F BODY MASS INDEX DOCD: CPT | Performed by: FAMILY MEDICINE

## 2021-04-10 PROCEDURE — 36415 COLL VENOUS BLD VENIPUNCTURE: CPT

## 2021-04-10 PROCEDURE — 82785 ASSAY OF IGE: CPT

## 2021-04-10 PROCEDURE — 99214 OFFICE O/P EST MOD 30 MIN: CPT | Performed by: FAMILY MEDICINE

## 2021-04-10 PROCEDURE — 3074F SYST BP LT 130 MM HG: CPT | Performed by: FAMILY MEDICINE

## 2021-04-10 PROCEDURE — 3078F DIAST BP <80 MM HG: CPT | Performed by: FAMILY MEDICINE

## 2021-04-10 RX ORDER — AZELASTINE HYDROCHLORIDE 0.5 MG/ML
1 SOLUTION/ DROPS OPHTHALMIC 2 TIMES DAILY
Qty: 1 BOTTLE | Refills: 1 | Status: SHIPPED | OUTPATIENT
Start: 2021-04-10 | End: 2021-08-08

## 2021-04-10 RX ORDER — AMOXICILLIN 500 MG/1
500 CAPSULE ORAL 3 TIMES DAILY
COMMUNITY
Start: 2021-03-26 | End: 2021-04-10 | Stop reason: ALTCHOICE

## 2021-04-10 RX ORDER — ROSUVASTATIN CALCIUM 10 MG/1
10 TABLET, COATED ORAL NIGHTLY
Qty: 90 TABLET | Refills: 3 | Status: SHIPPED | OUTPATIENT
Start: 2021-04-10 | End: 2021-08-08

## 2021-04-10 RX ORDER — MONTELUKAST SODIUM 10 MG/1
10 TABLET ORAL NIGHTLY
Qty: 90 TABLET | Refills: 1 | Status: SHIPPED | OUTPATIENT
Start: 2021-04-10 | End: 2021-10-07

## 2021-04-10 RX ORDER — CANAGLIFLOZIN AND METFORMIN HYDROCHLORIDE 150; 1000 MG/1; MG/1
TABLET, FILM COATED, EXTENDED RELEASE ORAL DAILY
Qty: 180 TABLET | Refills: 1 | COMMUNITY
Start: 2021-04-10 | End: 2021-09-11

## 2021-04-10 RX ORDER — MOMETASONE 50 UG/1
2 SPRAY, METERED NASAL DAILY
Qty: 3 INHALER | Refills: 1 | Status: SHIPPED | OUTPATIENT
Start: 2021-04-10 | End: 2021-10-23

## 2021-04-10 NOTE — PATIENT INSTRUCTIONS
Take daily Nasonex every morning and take the Singulair 10 mg every night. The eyedrops are to be used as needed.

## 2021-04-10 NOTE — PROGRESS NOTES
Patient ID: Cristiana Pressley is a 54year old female. HPI  Patient presents with: Follow - Up: Cholesterol  Allergies: Sneezing/watery eyes    Last seen by me on 2/27/2021. Pt states she was unable to work last week due to her allergies.  She c/o fibroid 2016    lupron injections.        Past Surgical History:   Procedure Laterality Date   • ABDOMINAL HYSTERECTOMY, BSO N/A 3/30/2018    Performed by Tracy Sigala MD at [address] [place] [address] MAIN OR   • HYSTERECTOMY            Current Outpatient Medications   Me reviewed. Assessment/Plan:      Diagnoses and all orders for this visit:    Other allergic rhinitis  -     ALLERGEN [place] CENT. REG. PROF; Future  -     Montelukast Sodium (SINGULAIR) 10 MG Oral Tab; Take 1 tablet (10 mg total) by mouth nightly.

## 2021-04-14 ENCOUNTER — IMMUNIZATION (OUTPATIENT)
Dept: LAB | Facility: HOSPITAL | Age: 51
End: 2021-04-14
Attending: HOSPITALIST
Payer: COMMERCIAL

## 2021-04-14 DIAGNOSIS — Z23 NEED FOR VACCINATION: Primary | ICD-10-CM

## 2021-04-14 PROCEDURE — 0011A SARSCOV2 VAC 100MCG/0.5ML IM: CPT

## 2021-05-12 ENCOUNTER — IMMUNIZATION (OUTPATIENT)
Dept: LAB | Facility: HOSPITAL | Age: 51
End: 2021-05-12
Attending: EMERGENCY MEDICINE
Payer: COMMERCIAL

## 2021-05-12 DIAGNOSIS — Z23 NEED FOR VACCINATION: Primary | ICD-10-CM

## 2021-05-12 PROCEDURE — 0012A SARSCOV2 VAC 100MCG/0.5ML IM: CPT

## 2021-06-01 DIAGNOSIS — I10 ESSENTIAL HYPERTENSION: ICD-10-CM

## 2021-06-01 RX ORDER — LISINOPRIL 5 MG/1
5 TABLET ORAL DAILY
Qty: 90 TABLET | Refills: 0 | Status: SHIPPED | OUTPATIENT
Start: 2021-06-01 | End: 2021-08-22

## 2021-06-22 ENCOUNTER — OFFICE VISIT (OUTPATIENT)
Dept: OBGYN CLINIC | Facility: CLINIC | Age: 51
End: 2021-06-22
Payer: COMMERCIAL

## 2021-06-22 VITALS
SYSTOLIC BLOOD PRESSURE: 112 MMHG | HEIGHT: 63 IN | BODY MASS INDEX: 25.69 KG/M2 | WEIGHT: 145 LBS | DIASTOLIC BLOOD PRESSURE: 66 MMHG

## 2021-06-22 DIAGNOSIS — R68.82 DECREASED LIBIDO: ICD-10-CM

## 2021-06-22 DIAGNOSIS — N89.8 VAGINAL DRYNESS: ICD-10-CM

## 2021-06-22 DIAGNOSIS — Z01.411 ENCOUNTER FOR GYNECOLOGICAL EXAMINATION WITH ABNORMAL FINDING: Primary | ICD-10-CM

## 2021-06-22 PROCEDURE — 99396 PREV VISIT EST AGE 40-64: CPT | Performed by: OBSTETRICS & GYNECOLOGY

## 2021-06-22 PROCEDURE — 3074F SYST BP LT 130 MM HG: CPT | Performed by: OBSTETRICS & GYNECOLOGY

## 2021-06-22 PROCEDURE — 99213 OFFICE O/P EST LOW 20 MIN: CPT | Performed by: OBSTETRICS & GYNECOLOGY

## 2021-06-22 PROCEDURE — 3078F DIAST BP <80 MM HG: CPT | Performed by: OBSTETRICS & GYNECOLOGY

## 2021-06-22 PROCEDURE — 3008F BODY MASS INDEX DOCD: CPT | Performed by: OBSTETRICS & GYNECOLOGY

## 2021-06-22 RX ORDER — ESTRADIOL 10 UG/1
10 INSERT VAGINAL
Qty: 40 TABLET | Refills: 1 | Status: SHIPPED | OUTPATIENT
Start: 2021-06-24 | End: 2021-07-24

## 2021-06-22 NOTE — PROGRESS NOTES
HPI:   Lalit Nicholas is a 54year old female who presents for an annual exam.     1) annual exam:  Well woman counseling done including healthy living/diet/others. . Self breast exam explained. Health maintenance. Body mass index is 25.69 kg/m². , akanksha lb (65.8 kg)  04/10/21 : 143 lb 3.2 oz (65 kg)  02/27/21 : 144 lb 12.8 oz (65.7 kg)  11/20/20 : 149 lb (67.6 kg)  09/11/20 : 152 lb (68.9 kg)  06/01/20 : 158 lb (71.7 kg)    Body mass index is 25.69 kg/m².      Cholesterol, Total (mg/dL)   Date Value   04/0 Bottle 1      Past Medical History:   Diagnosis Date   • Diabetes ([facility]    • High blood pressure    • High cholesterol    • Lipid screening 10/19/2013    per NG   • Type 2 diabetes mellitus without complication, without long-term current use of insulin (HC is normal,scant discharge,cuff is pink,no adnexal masses or tenderness    MUSCULOSKELETAL: back is not tender,FROM of the back  EXTREMITIES: no cyanosis, clubbing or edema  NEURO: Oriented times three      ASSESSMENT AND PLAN:   Lalit gonzalez a [place] to her pharmacy. Patient will start using this now and will follow up in the office in 1 to 2 months. Patient will call for any problems. All questions were answered. Reviewed pt's epic chart including notes/labs/results.       The patient is asked to

## 2021-08-21 DIAGNOSIS — I10 ESSENTIAL HYPERTENSION: ICD-10-CM

## 2021-08-22 RX ORDER — LISINOPRIL 5 MG/1
5 TABLET ORAL DAILY
Qty: 90 TABLET | Refills: 1 | Status: SHIPPED | OUTPATIENT
Start: 2021-08-22 | End: 2022-01-31

## 2021-08-22 NOTE — TELEPHONE ENCOUNTER
Refill passed per [facility].     Requested Prescriptions   Pending Prescriptions Disp Refills    LISINOPRIL 5 MG Oral Tab [Pharmacy Med Name: Lisinopril 5 Mg Tab Solc] 90 tablet 0     Sig: Take 1 tablet (5 mg total) by mouth daily        Hypertensive Medications Protocol Passed - 8/21/2021  1:31 AM        Passed - CMP or BMP in past 12 months        Passed - Appointment in past 6 or next 3 months        Passed - GFR Non- > 50     Lab Results   Component Value Date    GFRNAA 96 02/20/2021                     Recent Outpatient Visits              2 months ago Encounter for gynecological examination with abnormal finding    [place] [facility], [facility], [address] [place] [address] Magaly Martinez MD    Office Visit    4 months ago Other allergic rhinitis    [facility], [address], [address], [place], DO    Office Visit    5 months ago Type 2 diabetes mellitus without complication, without long-term current use of insulin ([address]    [facility], [address], [address], [place], [place]    Office Visit    9 months ago Uncontrolled type 2 diabetes mellitus with hyperglycemia [facility])    [place] [facility], [place] [place], Shauna Wright MD    Office Visit    11 months ago Uncontrolled type 2 diabetes mellitus with hyperglycemia [facility])    [facility], [address], [address], [place], [place]    Office Visit

## 2021-09-11 DIAGNOSIS — E11.9 TYPE 2 DIABETES MELLITUS WITHOUT COMPLICATION, WITHOUT LONG-TERM CURRENT USE OF INSULIN (HCC): ICD-10-CM

## 2021-09-11 NOTE — TELEPHONE ENCOUNTER
Please review.  Protocol failed / No Protocol    Requested Prescriptions   Pending Prescriptions Disp Refills    INVOKAMET -1000 MG Oral Tablet 24 Hr [Pharmacy Med Name: Ladi Blanca Xr 24hr 150-1,000 Mg Tab Jans] 180 tablet 0     Sig: Take 2 tablets by mouth daily for diabetes        Diabetes Medication Protocol Failed - 9/11/2021  1:32 AM        Failed - Last A1C < 7.5 and within past 6 months     Lab Results   Component Value Date    A1C 7.0 (H) 02/20/2021               Passed - Appointment in past 6 or next 3 months        Passed - GFR Non- > 50     Lab Results   Component Value Date    GFRNAA 96 02/20/2021                 Passed - GFR in the past 12 months              Recent Outpatient Visits              2 months ago Encounter for gynecological examination with abnormal finding    [facility], [address] [place] [address], [address] [place] [address] Estelita Che MD    Office Visit    5 months ago Other allergic rhinitis    [facility], [address], [address], [place],     Office Visit    6 months ago Type 2 diabetes mellitus without complication, without long-term current use of insulin ([address]    [facility], [address], [address], [place],     Office Visit    9 months ago Uncontrolled type 2 diabetes mellitus with hyperglycemia [facility])    [facility], [address], Charles Lu MD    Office Visit    1 year ago Uncontrolled type 2 diabetes mellitus with hyperglycemia [facility])    [facility], [address], [address], [place], [place]    Office Visit

## 2021-09-12 RX ORDER — CANAGLIFLOZIN AND METFORMIN HYDROCHLORIDE 150; 1000 MG/1; MG/1
2 TABLET, FILM COATED, EXTENDED RELEASE ORAL DAILY
Qty: 180 TABLET | Refills: 0 | Status: SHIPPED | OUTPATIENT
Start: 2021-09-12 | End: 2021-12-06

## 2021-09-12 NOTE — TELEPHONE ENCOUNTER
Refilled times 1 but needs appointment before the next prescription will be filled. Please call patient and set up an office visit as overdue for diabetes. Redd Rodriguez

## 2021-09-13 ENCOUNTER — TELEPHONE (OUTPATIENT)
Dept: FAMILY MEDICINE CLINIC | Facility: CLINIC | Age: 51
End: 2021-09-13

## 2021-09-13 NOTE — TELEPHONE ENCOUNTER
Per pharmacy PA is needed for the following medication. •  Canagliflozin-metFORMIN HCl ER (INVOKAMET XR) 150-1000 MG Oral Tablet 24 Hr, Take 2 tablets by mouth daily. , Disp: 180 tablet, Rfl: 0    Key: [id number]  Patient last name: Leroy Vazquez  :

## 2021-09-13 NOTE — TELEPHONE ENCOUNTER
Drug  Invokamet -1000MG er tablets  Form  [facility] Call-in Form  Used to document patient and prescription details when calling [employer] to initiate a prior authorization  [phone] phone  Original Claim Info  32

## 2021-09-22 NOTE — TELEPHONE ENCOUNTER
Spoke, with the patient and informed her of the message below. Pt did make an appt to see Korina Bowen on 10-23-21.

## 2021-10-18 NOTE — TELEPHONE ENCOUNTER
Refill request from [facility] for:  •  Accu-Chek Softclix Lancets Does not apply Misc, TEST ONCE DAILY, Disp: 100 each, Rfl: 3

## 2021-10-19 RX ORDER — LANCETS
EACH MISCELLANEOUS
Qty: 100 EACH | Refills: 3 | Status: SHIPPED | OUTPATIENT
Start: 2021-10-19

## 2021-10-23 ENCOUNTER — OFFICE VISIT (OUTPATIENT)
Dept: FAMILY MEDICINE CLINIC | Facility: CLINIC | Age: 51
End: 2021-10-23
Payer: COMMERCIAL

## 2021-10-23 ENCOUNTER — LAB ENCOUNTER (OUTPATIENT)
Dept: LAB | Age: 51
End: 2021-10-23
Attending: FAMILY MEDICINE
Payer: COMMERCIAL

## 2021-10-23 VITALS
DIASTOLIC BLOOD PRESSURE: 62 MMHG | TEMPERATURE: 97 F | WEIGHT: 145.38 LBS | SYSTOLIC BLOOD PRESSURE: 96 MMHG | BODY MASS INDEX: 25.76 KG/M2 | HEART RATE: 73 BPM | HEIGHT: 63 IN

## 2021-10-23 DIAGNOSIS — I10 ESSENTIAL HYPERTENSION: ICD-10-CM

## 2021-10-23 DIAGNOSIS — E78.2 MIXED HYPERLIPIDEMIA: ICD-10-CM

## 2021-10-23 DIAGNOSIS — J30.89 OTHER ALLERGIC RHINITIS: ICD-10-CM

## 2021-10-23 DIAGNOSIS — Z12.31 VISIT FOR SCREENING MAMMOGRAM: ICD-10-CM

## 2021-10-23 DIAGNOSIS — Z12.11 SCREENING FOR COLON CANCER: ICD-10-CM

## 2021-10-23 DIAGNOSIS — E11.9 TYPE 2 DIABETES MELLITUS WITHOUT COMPLICATION, WITHOUT LONG-TERM CURRENT USE OF INSULIN (HCC): Primary | ICD-10-CM

## 2021-10-23 DIAGNOSIS — E11.9 TYPE 2 DIABETES MELLITUS WITHOUT COMPLICATION, WITHOUT LONG-TERM CURRENT USE OF INSULIN (HCC): ICD-10-CM

## 2021-10-23 DIAGNOSIS — Z23 NEED FOR VACCINATION: ICD-10-CM

## 2021-10-23 PROCEDURE — 84450 TRANSFERASE (AST) (SGOT): CPT

## 2021-10-23 PROCEDURE — 85025 COMPLETE CBC W/AUTO DIFF WBC: CPT

## 2021-10-23 PROCEDURE — 3078F DIAST BP <80 MM HG: CPT | Performed by: FAMILY MEDICINE

## 2021-10-23 PROCEDURE — 99214 OFFICE O/P EST MOD 30 MIN: CPT | Performed by: FAMILY MEDICINE

## 2021-10-23 PROCEDURE — 84460 ALANINE AMINO (ALT) (SGPT): CPT

## 2021-10-23 PROCEDURE — 3008F BODY MASS INDEX DOCD: CPT | Performed by: FAMILY MEDICINE

## 2021-10-23 PROCEDURE — 83036 HEMOGLOBIN GLYCOSYLATED A1C: CPT

## 2021-10-23 PROCEDURE — 84443 ASSAY THYROID STIM HORMONE: CPT

## 2021-10-23 PROCEDURE — 90471 IMMUNIZATION ADMIN: CPT | Performed by: FAMILY MEDICINE

## 2021-10-23 PROCEDURE — 36415 COLL VENOUS BLD VENIPUNCTURE: CPT

## 2021-10-23 PROCEDURE — 80048 BASIC METABOLIC PNL TOTAL CA: CPT

## 2021-10-23 PROCEDURE — 3051F HG A1C>EQUAL 7.0%<8.0%: CPT | Performed by: FAMILY MEDICINE

## 2021-10-23 PROCEDURE — 90686 IIV4 VACC NO PRSV 0.5 ML IM: CPT | Performed by: FAMILY MEDICINE

## 2021-10-23 PROCEDURE — 3074F SYST BP LT 130 MM HG: CPT | Performed by: FAMILY MEDICINE

## 2021-10-23 PROCEDURE — 80061 LIPID PANEL: CPT

## 2021-10-23 RX ORDER — ROSUVASTATIN CALCIUM 10 MG/1
TABLET, COATED ORAL
COMMUNITY
Start: 2021-10-13

## 2021-10-23 RX ORDER — MONTELUKAST SODIUM 10 MG/1
10 TABLET ORAL NIGHTLY
Qty: 90 TABLET | Refills: 1 | Status: SHIPPED | OUTPATIENT
Start: 2021-10-23

## 2021-10-23 RX ORDER — MONTELUKAST SODIUM 10 MG/1
TABLET ORAL
COMMUNITY
Start: 2021-10-16 | End: 2021-10-23

## 2021-10-23 RX ORDER — MOMETASONE 50 UG/1
2 SPRAY, METERED NASAL DAILY
Qty: 3 EACH | Refills: 1 | Status: SHIPPED | OUTPATIENT
Start: 2021-10-23

## 2021-10-23 NOTE — PROGRESS NOTES
Patient ID: Tino Loco is a 54year old female. HPI  Patient presents with:  Diabetes: f/u    Last seen by me on 4/10/2021. Pt is taking nasonex and montelukast for seasonal allergies as needed. I reviewed the pt's labs.  The pt's most Misc TEST ONCE DAILY 100 each 3   • Canagliflozin-metFORMIN HCl ER (INVOKAMET XR) 150-1000 MG Oral Tablet 24 Hr Take 2 tablets by mouth daily. 180 tablet 0   • lisinopril 5 MG Oral Tab Take 1 tablet (5 mg total) by mouth daily.  90 tablet 1   • Mometasone F Blood Gluc Meter Disp-Strips Does not apply Device; Needs 1 glucometer along with 100 test strips and 100 lancets with 11 refills each. Check sugars BID and prn.     Need for vaccination  -     FLULAVAL INFLUENZA VACCINE QUAD PRESERVATIVE FREE 0.5 ML    Ess my name below, Saúl Anguiano,  attest that this documentation has been prepared under the direction and in the presence of Steffanie Garcia DO.    Electronically Signed: Dalton Fang, 10/23/2021, 9:47 AM.    I, Jorge Ritter DO,  personally perfo

## 2021-12-06 DIAGNOSIS — E11.9 TYPE 2 DIABETES MELLITUS WITHOUT COMPLICATION, WITHOUT LONG-TERM CURRENT USE OF INSULIN (HCC): ICD-10-CM

## 2021-12-06 RX ORDER — CANAGLIFLOZIN AND METFORMIN HYDROCHLORIDE 150; 1000 MG/1; MG/1
2 TABLET, FILM COATED, EXTENDED RELEASE ORAL DAILY
Qty: 180 TABLET | Refills: 1 | Status: SHIPPED | OUTPATIENT
Start: 2021-12-06

## 2021-12-06 NOTE — TELEPHONE ENCOUNTER
Refill passed per [facility] protocol.    Requested Prescriptions   Pending Prescriptions Disp Refills    INVOKAMET -1000 MG Oral Tablet 24 Hr [Pharmacy Med Name: Invokamet Xr 24hr 150-1,000 Mg Tab Jans] 180 tablet 0     Sig: Take 2 tablets by mo

## 2021-12-22 ENCOUNTER — OFFICE VISIT (OUTPATIENT)
Dept: GASTROENTEROLOGY | Facility: CLINIC | Age: 51
End: 2021-12-22
Payer: COMMERCIAL

## 2021-12-22 ENCOUNTER — TELEPHONE (OUTPATIENT)
Dept: GASTROENTEROLOGY | Facility: CLINIC | Age: 51
End: 2021-12-22

## 2021-12-22 VITALS
WEIGHT: 153 LBS | SYSTOLIC BLOOD PRESSURE: 110 MMHG | DIASTOLIC BLOOD PRESSURE: 64 MMHG | BODY MASS INDEX: 27.11 KG/M2 | HEIGHT: 63 IN

## 2021-12-22 DIAGNOSIS — Z12.11 COLON CANCER SCREENING: Primary | ICD-10-CM

## 2021-12-22 DIAGNOSIS — Z12.11 SCREEN FOR COLON CANCER: Primary | ICD-10-CM

## 2021-12-22 PROCEDURE — 3008F BODY MASS INDEX DOCD: CPT | Performed by: INTERNAL MEDICINE

## 2021-12-22 PROCEDURE — 3078F DIAST BP <80 MM HG: CPT | Performed by: INTERNAL MEDICINE

## 2021-12-22 PROCEDURE — 3074F SYST BP LT 130 MM HG: CPT | Performed by: INTERNAL MEDICINE

## 2021-12-22 PROCEDURE — S0285 CNSLT BEFORE SCREEN COLONOSC: HCPCS | Performed by: INTERNAL MEDICINE

## 2021-12-22 NOTE — PROGRESS NOTES
Damien Alston is a 54year old female.     HPI:   Patient presents with:  Colonoscopy Screening: no prior colon; no current complaints    The patient is a 74-year-old female with a history of diabetes, hypercholesterol, high pretension, type 2 diabete snort it down throat. 3 each 1   • montelukast 10 MG Oral Tab Take 1 tablet (10 mg total) by mouth nightly.  90 tablet 1   • Blood Gluc Meter Disp-Strips Does not apply Device Needs 1 glucometer along with 100 test strips and 100 lancets with 11 refills eac risks, benefits, and alternatives to colonoscopy with the patient [who demonstrated understanding], including but not limited to the risks of bleeding, infection, pain, death, as well as the risks of anesthesia and perforation all leading to prolonged hosp

## 2021-12-22 NOTE — TELEPHONE ENCOUNTER
Rescheduled for:  Colonoscopy 62517  Provider Name:  Dr. Viv Guillermo  Date:    From-2/8/22 To-2/18/22  Location:    [facility]  Sedation:  MAC  Time:  0930 (pt is aware to arrive at 0830)   Prep:  Golytely, mailed on 12/22/21  Meds/Allergies Reconciled?:  Physician re

## 2021-12-22 NOTE — TELEPHONE ENCOUNTER
Pts  Julia Medrano called to reschedule 2/8/22 colonoscopy because [place]. Please call.

## 2021-12-22 NOTE — TELEPHONE ENCOUNTER
Scheduled for:  Colonoscopy 36823  Provider Name:  Dr. Enrico Jett  Date:  02/08/2022  Location:  [facility]  Sedation:  MAC  Time:  9:30 am, arrival 8:30 am  Prep:  Golytely  Meds/Allergies Reconciled?: Physician reviewed   Diagnosis with codes:  Screening for colon

## 2021-12-22 NOTE — PATIENT INSTRUCTIONS
Colonoscopy for colon cancer screening  - Golytely   - MAC sedation  - hold diabetic meds day before  - hold lisinopril day of procedure

## 2022-01-30 DIAGNOSIS — I10 ESSENTIAL HYPERTENSION: ICD-10-CM

## 2022-01-31 RX ORDER — LISINOPRIL 5 MG/1
5 TABLET ORAL DAILY
Qty: 90 TABLET | Refills: 1 | Status: SHIPPED | OUTPATIENT
Start: 2022-01-31 | End: 2022-05-28

## 2022-01-31 NOTE — TELEPHONE ENCOUNTER
Refill passed per [facility] protocol. Requested Prescriptions   Pending Prescriptions Disp Refills    LISINOPRIL 5 MG Oral Tab [Pharmacy Med Name: Lisinopril 5 Mg Tab Solc] 90 tablet 0     Sig: Take 1 tablet (5 mg total) by mouth daily.         Hypertensive Medications Protocol Passed - 1/30/2022  1:30 AM        Passed - CMP or BMP in past 12 months        Passed - Appointment in past 6 or next 3 months        Passed - GFR Non- > 50     Lab Results   Component Value Date    GFRNAA 106 10/23/2021                        Recent Outpatient Visits              1 month ago Colon cancer screening    [facility], [address], Roselyn Atkinson MD    Office Visit    3 months ago Type 2 diabetes mellitus without complication, without long-term current use of insulin ([facility]    [place] [facility], [address], [address], [place], [place]    Office Visit    7 months ago Encounter for gynecological examination with abnormal finding    [place] [facility], [facility], [address] [place] [address] Daniel Paul MD    Office Visit    9 months ago Other allergic rhinitis    [place] [facility], [address], [address], [place], [place]    Office Visit    11 months ago Type 2 diabetes mellitus without complication, without long-term current use of insulin [facility])    [place] [facility] [place], [address], [address], [place], [place]    Office Visit            Future Appointments         Provider Department Appt Notes    In 1 week [facility], [address], [address], [place]     In 2 weeks [facility], PROCEDURE [address] GI PROCEDURE Colon @ [facility]

## 2022-02-02 NOTE — TELEPHONE ENCOUNTER
Mateus Bolton,    Can you confirm that we billed for this total abdominal hysterectomy?   Thanks    Arminda Brennan Continue Regimen: Ketoconazole shampoo QD Detail Level: Zone Initiate Treatment: Ketoconazole cream BID

## 2022-02-15 ENCOUNTER — LAB ENCOUNTER (OUTPATIENT)
Dept: LAB | Age: 52
End: 2022-02-15
Attending: INTERNAL MEDICINE
Payer: COMMERCIAL

## 2022-02-15 DIAGNOSIS — Z20.822 ENCOUNTER FOR PREOPERATIVE SCREENING LABORATORY TESTING FOR COVID-19 VIRUS: ICD-10-CM

## 2022-02-15 DIAGNOSIS — Z01.812 ENCOUNTER FOR PREOPERATIVE SCREENING LABORATORY TESTING FOR COVID-19 VIRUS: ICD-10-CM

## 2022-02-16 LAB — SARS-COV-2 RNA RESP QL NAA+PROBE: NOT DETECTED

## 2022-02-18 ENCOUNTER — HOSPITAL ENCOUNTER (OUTPATIENT)
Age: 52
Setting detail: HOSPITAL OUTPATIENT SURGERY
Discharge: HOME | End: 2022-02-18
Attending: INTERNAL MEDICINE | Admitting: INTERNAL MEDICINE
Payer: COMMERCIAL

## 2022-02-18 ENCOUNTER — ANESTHESIA (OUTPATIENT)
Dept: ENDOSCOPY | Age: 52
End: 2022-02-18
Payer: COMMERCIAL

## 2022-02-18 ENCOUNTER — ANESTHESIA EVENT (OUTPATIENT)
Dept: ENDOSCOPY | Age: 52
End: 2022-02-18
Payer: COMMERCIAL

## 2022-02-18 VITALS
WEIGHT: 145 LBS | SYSTOLIC BLOOD PRESSURE: 98 MMHG | RESPIRATION RATE: 18 BRPM | OXYGEN SATURATION: 99 % | HEART RATE: 70 BPM | BODY MASS INDEX: 25.69 KG/M2 | TEMPERATURE: 97 F | DIASTOLIC BLOOD PRESSURE: 57 MMHG | HEIGHT: 63 IN

## 2022-02-18 DIAGNOSIS — Z01.812 ENCOUNTER FOR PREOPERATIVE SCREENING LABORATORY TESTING FOR COVID-19 VIRUS: Primary | ICD-10-CM

## 2022-02-18 DIAGNOSIS — Z12.11 SCREEN FOR COLON CANCER: ICD-10-CM

## 2022-02-18 DIAGNOSIS — Z20.822 ENCOUNTER FOR PREOPERATIVE SCREENING LABORATORY TESTING FOR COVID-19 VIRUS: Primary | ICD-10-CM

## 2022-02-18 LAB — GLUCOSE BLDC GLUCOMTR-MCNC: 116 MG/DL (ref 70–99)

## 2022-02-18 PROCEDURE — 82962 GLUCOSE BLOOD TEST: CPT

## 2022-02-18 PROCEDURE — 45385 COLONOSCOPY W/LESION REMOVAL: CPT | Performed by: INTERNAL MEDICINE

## 2022-02-18 PROCEDURE — 88305 TISSUE EXAM BY PATHOLOGIST: CPT | Performed by: INTERNAL MEDICINE

## 2022-02-18 RX ORDER — NICOTINE POLACRILEX 4 MG
15 LOZENGE BUCCAL
OUTPATIENT
Start: 2022-02-18

## 2022-02-18 RX ORDER — NALOXONE HYDROCHLORIDE 0.4 MG/ML
80 INJECTION, SOLUTION INTRAMUSCULAR; INTRAVENOUS; SUBCUTANEOUS AS NEEDED
OUTPATIENT
Start: 2022-02-18 | End: 2022-02-18

## 2022-02-18 RX ORDER — SODIUM CHLORIDE, SODIUM LACTATE, POTASSIUM CHLORIDE, CALCIUM CHLORIDE 600; 310; 30; 20 MG/100ML; MG/100ML; MG/100ML; MG/100ML
INJECTION, SOLUTION INTRAVENOUS CONTINUOUS
OUTPATIENT
Start: 2022-02-18

## 2022-02-18 RX ORDER — SODIUM CHLORIDE, SODIUM LACTATE, POTASSIUM CHLORIDE, CALCIUM CHLORIDE 600; 310; 30; 20 MG/100ML; MG/100ML; MG/100ML; MG/100ML
INJECTION, SOLUTION INTRAVENOUS CONTINUOUS
Status: DISCONTINUED | OUTPATIENT
Start: 2022-02-18 | End: 2022-02-18

## 2022-02-18 RX ORDER — NICOTINE POLACRILEX 4 MG
30 LOZENGE BUCCAL
OUTPATIENT
Start: 2022-02-18

## 2022-02-18 RX ORDER — DEXTROSE MONOHYDRATE 25 G/50ML
50 INJECTION, SOLUTION INTRAVENOUS
OUTPATIENT
Start: 2022-02-18

## 2022-02-18 RX ADMIN — SODIUM CHLORIDE, SODIUM LACTATE, POTASSIUM CHLORIDE, CALCIUM CHLORIDE: 600; 310; 30; 20 INJECTION, SOLUTION INTRAVENOUS at 09:13:00

## 2022-02-18 RX ADMIN — SODIUM CHLORIDE, SODIUM LACTATE, POTASSIUM CHLORIDE, CALCIUM CHLORIDE: 600; 310; 30; 20 INJECTION, SOLUTION INTRAVENOUS at 09:48:00

## 2022-02-18 NOTE — OPERATIVE REPORT
[facility] Endoscopy Report      Preoperative Diagnosis:  - colon cancer screening      Postoperative Diagnosis:  - colon polyp x 1  - diverticulosis  - internal hemorrhoids    Procedure:    Colonoscopy    Surgeon:  Miguel Calloway M.D. Anesthesia:  MAC sedation    Technique:  After informed consent, the patient was placed in the left lateral recumbent position. Digital rectal examination revealed no palpable intraluminal abnormalities. An Olympus variable stiffness 190 series HD colonoscope was inserted into the rectum and advanced under direct vision by following the lumen to the cecum. The colon was examined upon withdrawal in the left lateral position. The procedures were well tolerated without immediate complication. Findings:  The preparation of the colon was good. The terminal ileum was examined for 4 cm and visually normal.  The ileocecal valve was well preserved. The visualized colonic mucosa from the cecum to the anal verge was normal with an intact vascular pattern. Colon polyp x1 removed from the rectum, this was sessile approximately 4 mm in size and cold snare removed. Excellent hemostasis was noted the polypectomy site and specimen was retrieved and sent for analysis. Scattered rare diverticular were noted in the sigmoid colon, no evidence of diverticulitis. Small internal hemorrhoids noted on retroflexed view. Estimated blood loss-insignificant  Specimens-colon polyp as outlined above    Impression:  - colon polyp x 1  - diverticulosis  - internal hemorrhoids    Recommendations:  - Post polypectomy instructions given  - Repeat colonoscopy in 7- 10 years  - High fiber diet for diverticular disease  - Symptomatic treatment of hemorrhoids          [facility]  Giuseppe Bedoya MD  2/18/2022  9:51 AM

## 2022-02-22 ENCOUNTER — TELEPHONE (OUTPATIENT)
Dept: GASTROENTEROLOGY | Facility: CLINIC | Age: 52
End: 2022-02-22

## 2022-02-22 NOTE — TELEPHONE ENCOUNTER
Patient called back with ,BOBBY verified and results from Dr. Kunal Desir given. Patient voiced understanding.

## 2022-02-22 NOTE — TELEPHONE ENCOUNTER
#[id number]    TCB. Please transfer to triage. Health maintenance updated. 7 year colonoscopy recall placed in patient outreach. Next due on 02/18/2029 per Dr. Lg Seals.

## 2022-02-26 ENCOUNTER — OFFICE VISIT (OUTPATIENT)
Dept: FAMILY MEDICINE CLINIC | Facility: CLINIC | Age: 52
End: 2022-02-26
Payer: COMMERCIAL

## 2022-02-26 ENCOUNTER — LAB ENCOUNTER (OUTPATIENT)
Dept: LAB | Age: 52
End: 2022-02-26
Attending: FAMILY MEDICINE
Payer: COMMERCIAL

## 2022-02-26 VITALS
TEMPERATURE: 98 F | DIASTOLIC BLOOD PRESSURE: 75 MMHG | BODY MASS INDEX: 26.43 KG/M2 | SYSTOLIC BLOOD PRESSURE: 116 MMHG | HEART RATE: 67 BPM | HEIGHT: 63 IN | WEIGHT: 149.19 LBS

## 2022-02-26 DIAGNOSIS — E11.65 UNCONTROLLED TYPE 2 DIABETES MELLITUS WITH HYPERGLYCEMIA (HCC): Primary | ICD-10-CM

## 2022-02-26 DIAGNOSIS — K63.5 POLYP OF COLON, UNSPECIFIED PART OF COLON, UNSPECIFIED TYPE: ICD-10-CM

## 2022-02-26 DIAGNOSIS — E11.65 UNCONTROLLED TYPE 2 DIABETES MELLITUS WITH HYPERGLYCEMIA (HCC): ICD-10-CM

## 2022-02-26 LAB
EST. AVERAGE GLUCOSE BLD GHB EST-MCNC: 163 MG/DL (ref 68–126)
HBA1C MFR BLD: 7.3 % (ref ?–5.7)

## 2022-02-26 PROCEDURE — 3051F HG A1C>EQUAL 7.0%<8.0%: CPT | Performed by: FAMILY MEDICINE

## 2022-02-26 PROCEDURE — 83036 HEMOGLOBIN GLYCOSYLATED A1C: CPT

## 2022-02-26 PROCEDURE — 3078F DIAST BP <80 MM HG: CPT | Performed by: FAMILY MEDICINE

## 2022-02-26 PROCEDURE — 3074F SYST BP LT 130 MM HG: CPT | Performed by: FAMILY MEDICINE

## 2022-02-26 PROCEDURE — 99214 OFFICE O/P EST MOD 30 MIN: CPT | Performed by: FAMILY MEDICINE

## 2022-02-26 PROCEDURE — 36415 COLL VENOUS BLD VENIPUNCTURE: CPT

## 2022-02-26 PROCEDURE — 3008F BODY MASS INDEX DOCD: CPT | Performed by: FAMILY MEDICINE

## 2022-03-04 ENCOUNTER — OFFICE VISIT (OUTPATIENT)
Dept: FAMILY MEDICINE CLINIC | Facility: CLINIC | Age: 52
End: 2022-03-04
Payer: COMMERCIAL

## 2022-03-04 VITALS
SYSTOLIC BLOOD PRESSURE: 113 MMHG | HEIGHT: 63 IN | HEART RATE: 92 BPM | DIASTOLIC BLOOD PRESSURE: 75 MMHG | WEIGHT: 149 LBS | BODY MASS INDEX: 26.4 KG/M2

## 2022-03-04 DIAGNOSIS — E11.65 UNCONTROLLED TYPE 2 DIABETES MELLITUS WITH HYPERGLYCEMIA (HCC): ICD-10-CM

## 2022-03-04 DIAGNOSIS — E78.2 MIXED HYPERLIPIDEMIA: ICD-10-CM

## 2022-03-04 PROCEDURE — 3008F BODY MASS INDEX DOCD: CPT | Performed by: FAMILY MEDICINE

## 2022-03-04 PROCEDURE — 3078F DIAST BP <80 MM HG: CPT | Performed by: FAMILY MEDICINE

## 2022-03-04 PROCEDURE — 99214 OFFICE O/P EST MOD 30 MIN: CPT | Performed by: FAMILY MEDICINE

## 2022-03-04 PROCEDURE — 3074F SYST BP LT 130 MM HG: CPT | Performed by: FAMILY MEDICINE

## 2022-03-04 RX ORDER — METFORMIN HYDROCHLORIDE 500 MG/1
1000 TABLET, EXTENDED RELEASE ORAL 2 TIMES DAILY WITH MEALS
Qty: 360 TABLET | Refills: 1 | Status: SHIPPED | OUTPATIENT
Start: 2022-03-04 | End: 2022-06-02

## 2022-03-04 RX ORDER — ROSUVASTATIN CALCIUM 10 MG/1
10 TABLET, COATED ORAL NIGHTLY
Qty: 90 TABLET | Refills: 3 | Status: SHIPPED | OUTPATIENT
Start: 2022-03-04 | End: 2022-07-02

## 2022-03-04 RX ORDER — GLIMEPIRIDE 4 MG/1
4 TABLET ORAL 2 TIMES DAILY
Qty: 180 TABLET | Refills: 1 | Status: SHIPPED | OUTPATIENT
Start: 2022-03-04

## 2022-05-20 ENCOUNTER — LAB ENCOUNTER (OUTPATIENT)
Dept: LAB | Age: 52
End: 2022-05-20
Attending: FAMILY MEDICINE
Payer: COMMERCIAL

## 2022-05-20 DIAGNOSIS — E11.65 UNCONTROLLED TYPE 2 DIABETES MELLITUS WITH HYPERGLYCEMIA (HCC): ICD-10-CM

## 2022-05-20 LAB
ANION GAP SERPL CALC-SCNC: 8 MMOL/L (ref 0–18)
BUN BLD-MCNC: 15 MG/DL (ref 7–18)
BUN/CREAT SERPL: 19.7 (ref 10–20)
CALCIUM BLD-MCNC: 9.5 MG/DL (ref 8.5–10.1)
CHLORIDE SERPL-SCNC: 105 MMOL/L (ref 98–112)
CO2 SERPL-SCNC: 25 MMOL/L (ref 21–32)
CREAT BLD-MCNC: 0.76 MG/DL
EST. AVERAGE GLUCOSE BLD GHB EST-MCNC: 166 MG/DL (ref 68–126)
FASTING STATUS PATIENT QL REPORTED: YES
GLUCOSE BLD-MCNC: 89 MG/DL (ref 70–99)
HBA1C MFR BLD: 7.4 % (ref ?–5.7)
OSMOLALITY SERPL CALC.SUM OF ELEC: 286 MOSM/KG (ref 275–295)
POTASSIUM SERPL-SCNC: 4 MMOL/L (ref 3.5–5.1)
SODIUM SERPL-SCNC: 138 MMOL/L (ref 136–145)

## 2022-05-20 PROCEDURE — 36415 COLL VENOUS BLD VENIPUNCTURE: CPT

## 2022-05-20 PROCEDURE — 3051F HG A1C>EQUAL 7.0%<8.0%: CPT | Performed by: FAMILY MEDICINE

## 2022-05-20 PROCEDURE — 83036 HEMOGLOBIN GLYCOSYLATED A1C: CPT

## 2022-05-20 PROCEDURE — 80048 BASIC METABOLIC PNL TOTAL CA: CPT

## 2022-05-28 ENCOUNTER — OFFICE VISIT (OUTPATIENT)
Dept: FAMILY MEDICINE CLINIC | Facility: CLINIC | Age: 52
End: 2022-05-28
Payer: COMMERCIAL

## 2022-05-28 VITALS
DIASTOLIC BLOOD PRESSURE: 76 MMHG | SYSTOLIC BLOOD PRESSURE: 115 MMHG | HEART RATE: 80 BPM | TEMPERATURE: 98 F | BODY MASS INDEX: 26.61 KG/M2 | HEIGHT: 63 IN | WEIGHT: 150.19 LBS

## 2022-05-28 DIAGNOSIS — I10 ESSENTIAL HYPERTENSION: ICD-10-CM

## 2022-05-28 DIAGNOSIS — E11.65 UNCONTROLLED TYPE 2 DIABETES MELLITUS WITH HYPERGLYCEMIA (HCC): Primary | ICD-10-CM

## 2022-05-28 DIAGNOSIS — E78.2 MIXED HYPERLIPIDEMIA: ICD-10-CM

## 2022-05-28 DIAGNOSIS — Z23 NEED FOR VACCINATION: ICD-10-CM

## 2022-05-28 PROCEDURE — 90471 IMMUNIZATION ADMIN: CPT | Performed by: FAMILY MEDICINE

## 2022-05-28 PROCEDURE — 90750 HZV VACC RECOMBINANT IM: CPT | Performed by: FAMILY MEDICINE

## 2022-05-28 PROCEDURE — 99214 OFFICE O/P EST MOD 30 MIN: CPT | Performed by: FAMILY MEDICINE

## 2022-05-28 PROCEDURE — 3074F SYST BP LT 130 MM HG: CPT | Performed by: FAMILY MEDICINE

## 2022-05-28 PROCEDURE — 3008F BODY MASS INDEX DOCD: CPT | Performed by: FAMILY MEDICINE

## 2022-05-28 PROCEDURE — 3078F DIAST BP <80 MM HG: CPT | Performed by: FAMILY MEDICINE

## 2022-05-28 RX ORDER — LISINOPRIL 5 MG/1
5 TABLET ORAL DAILY
Qty: 90 TABLET | Refills: 3 | Status: SHIPPED | OUTPATIENT
Start: 2022-05-28

## 2022-05-29 DIAGNOSIS — J30.89 OTHER ALLERGIC RHINITIS: ICD-10-CM

## 2022-05-29 RX ORDER — MONTELUKAST SODIUM 10 MG/1
10 TABLET ORAL NIGHTLY
Qty: 90 TABLET | Refills: 1 | Status: SHIPPED | OUTPATIENT
Start: 2022-05-29

## 2022-05-30 NOTE — TELEPHONE ENCOUNTER
Refill passed per [address] protocol.    Requested Prescriptions   Pending Prescriptions Disp Refills    MONTELUKAST 10 MG Oral Tab [Pharmacy Med Name: Montelukast Sodium 10 Mg Tab Camb] 90 tablet 0     Sig: Take 1 tablet (10 mg total) by mouth once nightly        Asthma & COPD Medication Protocol Passed - 5/29/2022  2:31 AM        Passed - Appointment in past 6 or next 3 months            Recent Outpatient Visits              Yesterday Uncontrolled type 2 diabetes mellitus with hyperglycemia [facility])    [address] [place], Jessica, [address], [place], [place]    Office Visit    2 months ago Uncontrolled type 2 diabetes mellitus with hyperglycemia ([facility] [address]    [address] [place], Jessica, [address], [place], [place]    Office Visit    3 months ago Uncontrolled type 2 diabetes mellitus with hyperglycemia ([facility] [address]    [address] [place], [place], [address], [place],     Office Visit    5 months ago Colon cancer screening    [address] Ollis Gayer, MD    Office Visit    7 months ago Type 2 diabetes mellitus without complication, without long-term current use of insulin [facility])    [address] [place], Jessica, [address], [place], [place]    Office Visit          Future Appointments         Provider Department Appt Notes    In 3 months Lynn Alves, [facility], Ralph Lopez follow up/2nd marti

## 2022-08-15 DIAGNOSIS — E11.65 UNCONTROLLED TYPE 2 DIABETES MELLITUS WITH HYPERGLYCEMIA (HCC): ICD-10-CM

## 2022-08-15 RX ORDER — GLIMEPIRIDE 4 MG/1
4 TABLET ORAL 2 TIMES DAILY
Qty: 180 TABLET | Refills: 0 | Status: SHIPPED | OUTPATIENT
Start: 2022-08-15

## 2022-08-17 ENCOUNTER — HOSPITAL ENCOUNTER (OUTPATIENT)
Dept: MAMMOGRAPHY | Age: 52
Discharge: HOME | End: 2022-08-17
Attending: FAMILY MEDICINE
Payer: COMMERCIAL

## 2022-08-17 DIAGNOSIS — Z12.31 VISIT FOR SCREENING MAMMOGRAM: ICD-10-CM

## 2022-08-17 PROCEDURE — 77063 BREAST TOMOSYNTHESIS BI: CPT | Performed by: FAMILY MEDICINE

## 2022-08-17 PROCEDURE — 77067 SCR MAMMO BI INCL CAD: CPT | Performed by: FAMILY MEDICINE

## 2022-08-17 RX ORDER — ASPIRIN 81 MG/1
TABLET, COATED ORAL
Qty: 90 TABLET | Refills: 0 | Status: SHIPPED | OUTPATIENT
Start: 2022-08-17

## 2022-08-26 ENCOUNTER — LAB ENCOUNTER (OUTPATIENT)
Dept: LAB | Age: 52
End: 2022-08-26
Attending: FAMILY MEDICINE
Payer: COMMERCIAL

## 2022-08-26 DIAGNOSIS — E78.2 MIXED HYPERLIPIDEMIA: ICD-10-CM

## 2022-08-26 DIAGNOSIS — E11.65 UNCONTROLLED TYPE 2 DIABETES MELLITUS WITH HYPERGLYCEMIA (HCC): ICD-10-CM

## 2022-08-26 DIAGNOSIS — I10 ESSENTIAL HYPERTENSION: ICD-10-CM

## 2022-08-26 LAB
ALT SERPL-CCNC: 43 U/L
ANION GAP SERPL CALC-SCNC: 8 MMOL/L (ref 0–18)
AST SERPL-CCNC: 20 U/L (ref 15–37)
BASOPHILS # BLD AUTO: 0.03 X10(3) UL (ref 0–0.2)
BASOPHILS NFR BLD AUTO: 0.4 %
BUN BLD-MCNC: 16 MG/DL (ref 7–18)
BUN/CREAT SERPL: 20 (ref 10–20)
CALCIUM BLD-MCNC: 9.2 MG/DL (ref 8.5–10.1)
CHLORIDE SERPL-SCNC: 104 MMOL/L (ref 98–112)
CHOLEST SERPL-MCNC: 159 MG/DL (ref ?–200)
CO2 SERPL-SCNC: 25 MMOL/L (ref 21–32)
CREAT BLD-MCNC: 0.8 MG/DL
CREAT UR-SCNC: 41.8 MG/DL
DEPRECATED RDW RBC AUTO: 42.1 FL (ref 35.1–46.3)
EOSINOPHIL # BLD AUTO: 0.07 X10(3) UL (ref 0–0.7)
EOSINOPHIL NFR BLD AUTO: 0.9 %
ERYTHROCYTE [DISTWIDTH] IN BLOOD BY AUTOMATED COUNT: 12.6 % (ref 11–15)
EST. AVERAGE GLUCOSE BLD GHB EST-MCNC: 157 MG/DL (ref 68–126)
FASTING PATIENT LIPID ANSWER: YES
FASTING STATUS PATIENT QL REPORTED: YES
GFR SERPLBLD BASED ON 1.73 SQ M-ARVRAT: 90 ML/MIN/1.73M2 (ref 60–?)
GLUCOSE BLD-MCNC: 73 MG/DL (ref 70–99)
HBA1C MFR BLD: 7.1 % (ref ?–5.7)
HCT VFR BLD AUTO: 38.5 %
HDLC SERPL-MCNC: 37 MG/DL (ref 40–59)
HGB BLD-MCNC: 12.2 G/DL
IMM GRANULOCYTES # BLD AUTO: 0.03 X10(3) UL (ref 0–1)
IMM GRANULOCYTES NFR BLD: 0.4 %
LDLC SERPL CALC-MCNC: 74 MG/DL (ref ?–100)
LYMPHOCYTES # BLD AUTO: 3.12 X10(3) UL (ref 1–4)
LYMPHOCYTES NFR BLD AUTO: 37.9 %
MCH RBC QN AUTO: 29.1 PG (ref 26–34)
MCHC RBC AUTO-ENTMCNC: 31.7 G/DL (ref 31–37)
MCV RBC AUTO: 91.9 FL
MICROALBUMIN UR-MCNC: <0.5 MG/DL
MONOCYTES # BLD AUTO: 0.45 X10(3) UL (ref 0.1–1)
MONOCYTES NFR BLD AUTO: 5.5 %
NEUTROPHILS # BLD AUTO: 4.53 X10 (3) UL (ref 1.5–7.7)
NEUTROPHILS # BLD AUTO: 4.53 X10(3) UL (ref 1.5–7.7)
NEUTROPHILS NFR BLD AUTO: 54.9 %
NONHDLC SERPL-MCNC: 122 MG/DL (ref ?–130)
OSMOLALITY SERPL CALC.SUM OF ELEC: 284 MOSM/KG (ref 275–295)
PLATELET # BLD AUTO: 288 10(3)UL (ref 150–450)
POTASSIUM SERPL-SCNC: 4.2 MMOL/L (ref 3.5–5.1)
RBC # BLD AUTO: 4.19 X10(6)UL
SODIUM SERPL-SCNC: 137 MMOL/L (ref 136–145)
TRIGL SERPL-MCNC: 295 MG/DL (ref 30–149)
TSI SER-ACNC: 3.8 MIU/ML (ref 0.36–3.74)
VLDLC SERPL CALC-MCNC: 46 MG/DL (ref 0–30)
WBC # BLD AUTO: 8.2 X10(3) UL (ref 4–11)

## 2022-08-26 PROCEDURE — 36415 COLL VENOUS BLD VENIPUNCTURE: CPT

## 2022-08-26 PROCEDURE — 85025 COMPLETE CBC W/AUTO DIFF WBC: CPT

## 2022-08-26 PROCEDURE — 82570 ASSAY OF URINE CREATININE: CPT

## 2022-08-26 PROCEDURE — 3061F NEG MICROALBUMINURIA REV: CPT | Performed by: FAMILY MEDICINE

## 2022-08-26 PROCEDURE — 83036 HEMOGLOBIN GLYCOSYLATED A1C: CPT

## 2022-08-26 PROCEDURE — 3051F HG A1C>EQUAL 7.0%<8.0%: CPT | Performed by: FAMILY MEDICINE

## 2022-08-26 PROCEDURE — 80048 BASIC METABOLIC PNL TOTAL CA: CPT

## 2022-08-26 PROCEDURE — 82043 UR ALBUMIN QUANTITATIVE: CPT

## 2022-08-26 PROCEDURE — 80061 LIPID PANEL: CPT

## 2022-08-26 PROCEDURE — 84460 ALANINE AMINO (ALT) (SGPT): CPT

## 2022-08-26 PROCEDURE — 84443 ASSAY THYROID STIM HORMONE: CPT

## 2022-08-26 PROCEDURE — 84439 ASSAY OF FREE THYROXINE: CPT | Performed by: FAMILY MEDICINE

## 2022-08-26 PROCEDURE — 84481 FREE ASSAY (FT-3): CPT | Performed by: FAMILY MEDICINE

## 2022-08-26 PROCEDURE — 84450 TRANSFERASE (AST) (SGOT): CPT

## 2022-08-27 ENCOUNTER — OFFICE VISIT (OUTPATIENT)
Dept: FAMILY MEDICINE CLINIC | Facility: CLINIC | Age: 52
End: 2022-08-27
Payer: COMMERCIAL

## 2022-08-27 VITALS
HEART RATE: 73 BPM | WEIGHT: 150.81 LBS | DIASTOLIC BLOOD PRESSURE: 71 MMHG | HEIGHT: 63 IN | BODY MASS INDEX: 26.72 KG/M2 | TEMPERATURE: 97 F | SYSTOLIC BLOOD PRESSURE: 103 MMHG

## 2022-08-27 DIAGNOSIS — E11.65 UNCONTROLLED TYPE 2 DIABETES MELLITUS WITH HYPERGLYCEMIA (HCC): Primary | ICD-10-CM

## 2022-08-27 DIAGNOSIS — Z23 NEED FOR VACCINATION: ICD-10-CM

## 2022-08-27 DIAGNOSIS — E78.2 MIXED HYPERLIPIDEMIA: ICD-10-CM

## 2022-08-27 DIAGNOSIS — J30.89 OTHER ALLERGIC RHINITIS: ICD-10-CM

## 2022-08-27 DIAGNOSIS — E03.9 ACQUIRED HYPOTHYROIDISM: ICD-10-CM

## 2022-08-27 PROCEDURE — 3078F DIAST BP <80 MM HG: CPT | Performed by: FAMILY MEDICINE

## 2022-08-27 PROCEDURE — 3074F SYST BP LT 130 MM HG: CPT | Performed by: FAMILY MEDICINE

## 2022-08-27 PROCEDURE — 90750 HZV VACC RECOMBINANT IM: CPT | Performed by: FAMILY MEDICINE

## 2022-08-27 PROCEDURE — 3008F BODY MASS INDEX DOCD: CPT | Performed by: FAMILY MEDICINE

## 2022-08-27 PROCEDURE — 99214 OFFICE O/P EST MOD 30 MIN: CPT | Performed by: FAMILY MEDICINE

## 2022-08-27 PROCEDURE — 90471 IMMUNIZATION ADMIN: CPT | Performed by: FAMILY MEDICINE

## 2022-08-27 RX ORDER — LEVOTHYROXINE SODIUM 0.03 MG/1
25 TABLET ORAL
Qty: 90 TABLET | Refills: 1 | Status: SHIPPED | OUTPATIENT
Start: 2022-08-27

## 2022-08-27 RX ORDER — ROSUVASTATIN CALCIUM 10 MG/1
10 TABLET, COATED ORAL NIGHTLY
COMMUNITY
Start: 2022-08-15

## 2022-08-27 RX ORDER — MONTELUKAST SODIUM 10 MG/1
10 TABLET ORAL NIGHTLY
Qty: 90 TABLET | Refills: 1 | Status: SHIPPED | OUTPATIENT
Start: 2022-08-27

## 2022-08-27 RX ORDER — CANAGLIFLOZIN AND METFORMIN HYDROCHLORIDE 150; 1000 MG/1; MG/1
2 TABLET, FILM COATED, EXTENDED RELEASE ORAL DAILY
COMMUNITY
Start: 2022-08-16

## 2022-08-27 RX ORDER — MOMETASONE FUROATE 50 UG/1
2 SPRAY, METERED NASAL DAILY
Qty: 3 EACH | Refills: 1 | Status: SHIPPED | OUTPATIENT
Start: 2022-08-27

## 2022-09-02 DIAGNOSIS — E11.65 UNCONTROLLED TYPE 2 DIABETES MELLITUS WITH HYPERGLYCEMIA (HCC): ICD-10-CM

## 2022-09-02 NOTE — TELEPHONE ENCOUNTER
Refill passed per [address] [place] protocol. LOV 8/24  Not noted on current med list but also not noted as discontinued   Requested Prescriptions   Pending Prescriptions Disp Refills    METFORMIN  MG Oral Tablet 24 Hr [Pharmacy Med Name: Metformin Hydrochloride Er 24hr 500 Mg Tab Gran] 360 tablet 0     Sig: Take 2 tablets (1,000 mg total) by mouth 2 (two) times daily with meals.         Diabetes Medication Protocol Passed - 9/2/2022  1:32 AM        Passed - Last A1C < 7.5 and within past 6 months     Lab Results   Component Value Date    A1C 7.1 (H) 08/26/2022               Passed - In person appointment or virtual visit in the past 6 mos or appointment in next 3 mos       Recent Outpatient Visits              6 days ago Uncontrolled type 2 diabetes mellitus with hyperglycemia [facility])    [address], [address], [place],     Office Visit    3 months ago Uncontrolled type 2 diabetes mellitus with hyperglycemia ([facility] [address]    [address], [address], [place], [place]    Office Visit    6 months ago Uncontrolled type 2 diabetes mellitus with hyperglycemia ([facility] [address]    [address], [address], [facility]    Office Visit    6 months ago Uncontrolled type 2 diabetes mellitus with hyperglycemia ([facility] [address]    [address], [address], [place],     Office Visit    8 months ago Colon cancer screening    [address], [address], Vineet Peña MD    Office Visit                 Passed - GFR > 50     Lab Results   Component Value Date    EGFRCR 90 08/26/2022                 Passed - GFR in the past 12 months            Recent Outpatient Visits              6 days ago Uncontrolled type 2 diabetes mellitus with hyperglycemia [facility])    [address], [address], [facility]    Office Visit    3 months ago Uncontrolled type 2 diabetes mellitus with hyperglycemia [facility])    [address], [address], [place], [place] Office Visit    6 months ago Uncontrolled type 2 diabetes mellitus with hyperglycemia [facility])    [address], [address], [place], DO    Office Visit    6 months ago Uncontrolled type 2 diabetes mellitus with hyperglycemia [facility])    [address], [address], [place], DO    Office Visit    8 months ago Colon cancer screening    Vineet Saini MD    Office Visit

## 2022-09-04 RX ORDER — METFORMIN HYDROCHLORIDE 500 MG/1
1000 TABLET, EXTENDED RELEASE ORAL 2 TIMES DAILY WITH MEALS
Qty: 360 TABLET | Refills: 0 | Status: SHIPPED | OUTPATIENT
Start: 2022-09-04 | End: 2022-12-03

## 2022-09-04 NOTE — TELEPHONE ENCOUNTER
Please find out if pt is  Taking invokamet and metformin or just one of the medications.  She should not be on both

## 2022-09-04 NOTE — TELEPHONE ENCOUNTER
Patient does not have [facility], please call her on Tuesday, 9/6 when we are back in the office.

## 2022-09-06 ENCOUNTER — TELEPHONE (OUTPATIENT)
Dept: FAMILY MEDICINE CLINIC | Facility: CLINIC | Age: 52
End: 2022-09-06

## 2022-09-06 NOTE — TELEPHONE ENCOUNTER
Spoke to [employer], verified patient's Name and . Relayed Dr. Edgar Rodríguez message below.  Metformin taken off of patient's medication list.

## 2022-09-06 NOTE — TELEPHONE ENCOUNTER
She should be on the Invokamet XR and take 2 tablets at the same time. She should not be on separate metformin. Please take that off the medication list and let the pharmacy know.

## 2022-10-12 ENCOUNTER — LAB ENCOUNTER (OUTPATIENT)
Dept: LAB | Age: 52
End: 2022-10-12
Attending: FAMILY MEDICINE
Payer: COMMERCIAL

## 2022-10-12 DIAGNOSIS — E03.9 ACQUIRED HYPOTHYROIDISM: ICD-10-CM

## 2022-10-12 DIAGNOSIS — E11.65 UNCONTROLLED TYPE 2 DIABETES MELLITUS WITH HYPERGLYCEMIA (HCC): ICD-10-CM

## 2022-10-12 LAB
EST. AVERAGE GLUCOSE BLD GHB EST-MCNC: 163 MG/DL (ref 68–126)
HBA1C MFR BLD: 7.3 % (ref ?–5.7)
TSI SER-ACNC: 3.88 MIU/ML (ref 0.36–3.74)

## 2022-10-12 PROCEDURE — 84443 ASSAY THYROID STIM HORMONE: CPT

## 2022-10-12 PROCEDURE — 3051F HG A1C>EQUAL 7.0%<8.0%: CPT | Performed by: FAMILY MEDICINE

## 2022-10-12 PROCEDURE — 36415 COLL VENOUS BLD VENIPUNCTURE: CPT

## 2022-10-12 PROCEDURE — 83036 HEMOGLOBIN GLYCOSYLATED A1C: CPT

## 2022-10-31 ENCOUNTER — OFFICE VISIT (OUTPATIENT)
Dept: FAMILY MEDICINE CLINIC | Facility: CLINIC | Age: 52
End: 2022-10-31
Payer: COMMERCIAL

## 2022-10-31 VITALS
WEIGHT: 148 LBS | HEIGHT: 63 IN | BODY MASS INDEX: 26.22 KG/M2 | DIASTOLIC BLOOD PRESSURE: 74 MMHG | RESPIRATION RATE: 17 BRPM | TEMPERATURE: 98 F | SYSTOLIC BLOOD PRESSURE: 115 MMHG | HEART RATE: 73 BPM

## 2022-10-31 DIAGNOSIS — E03.9 ACQUIRED HYPOTHYROIDISM: ICD-10-CM

## 2022-10-31 DIAGNOSIS — R30.0 DYSURIA: Primary | ICD-10-CM

## 2022-10-31 DIAGNOSIS — Z23 NEED FOR VACCINATION: ICD-10-CM

## 2022-10-31 DIAGNOSIS — E11.65 UNCONTROLLED TYPE 2 DIABETES MELLITUS WITH HYPERGLYCEMIA (HCC): ICD-10-CM

## 2022-10-31 DIAGNOSIS — N30.90 CYSTITIS: ICD-10-CM

## 2022-10-31 LAB
APPEARANCE: CLEAR
BILIRUB UR QL: NEGATIVE
BILIRUBIN: NEGATIVE
CLARITY UR: CLEAR
COLOR UR: YELLOW
GLUCOSE (URINE DIPSTICK): 500 MG/DL
GLUCOSE UR-MCNC: >=500 MG/DL
HGB UR QL STRIP.AUTO: NEGATIVE
KETONES (URINE DIPSTICK): NEGATIVE MG/DL
KETONES UR-MCNC: NEGATIVE MG/DL
LEUKOCYTE ESTERASE UR QL STRIP.AUTO: NEGATIVE
LEUKOCYTES: NEGATIVE
MULTISTIX LOT#: ABNORMAL NUMERIC
NITRITE UR QL STRIP.AUTO: NEGATIVE
NITRITE, URINE: NEGATIVE
PH UR: 6 [PH] (ref 5–8)
PH, URINE: 6.5 (ref 4.5–8)
PROT UR-MCNC: NEGATIVE MG/DL
PROTEIN (URINE DIPSTICK): NEGATIVE MG/DL
SP GR UR STRIP: 1.02 (ref 1–1.03)
SPECIFIC GRAVITY: 1.01 (ref 1–1.03)
URINE-COLOR: YELLOW
UROBILINOGEN UR STRIP-ACNC: <2
UROBILINOGEN,SEMI-QN: 0.2 MG/DL (ref 0–1.9)
VIT C UR-MCNC: NEGATIVE MG/DL

## 2022-10-31 PROCEDURE — 3078F DIAST BP <80 MM HG: CPT | Performed by: FAMILY MEDICINE

## 2022-10-31 PROCEDURE — 3008F BODY MASS INDEX DOCD: CPT | Performed by: FAMILY MEDICINE

## 2022-10-31 PROCEDURE — 90471 IMMUNIZATION ADMIN: CPT | Performed by: FAMILY MEDICINE

## 2022-10-31 PROCEDURE — 99215 OFFICE O/P EST HI 40 MIN: CPT | Performed by: FAMILY MEDICINE

## 2022-10-31 PROCEDURE — 90686 IIV4 VACC NO PRSV 0.5 ML IM: CPT | Performed by: FAMILY MEDICINE

## 2022-10-31 PROCEDURE — 81003 URINALYSIS AUTO W/O SCOPE: CPT | Performed by: FAMILY MEDICINE

## 2022-10-31 PROCEDURE — 3074F SYST BP LT 130 MM HG: CPT | Performed by: FAMILY MEDICINE

## 2022-10-31 RX ORDER — GLIMEPIRIDE 4 MG/1
4 TABLET ORAL 2 TIMES DAILY
Qty: 180 TABLET | Refills: 1 | Status: SHIPPED | OUTPATIENT
Start: 2022-10-31

## 2022-10-31 RX ORDER — LEVOTHYROXINE SODIUM 0.05 MG/1
50 TABLET ORAL
Qty: 90 TABLET | Refills: 1 | Status: SHIPPED | OUTPATIENT
Start: 2022-10-31

## 2022-10-31 RX ORDER — PIOGLITAZONEHYDROCHLORIDE 15 MG/1
15 TABLET ORAL DAILY
Qty: 90 TABLET | Refills: 1 | Status: SHIPPED | OUTPATIENT
Start: 2022-10-31

## 2022-10-31 RX ORDER — CIPROFLOXACIN 250 MG/1
250 TABLET, FILM COATED ORAL 2 TIMES DAILY
Qty: 6 TABLET | Refills: 0 | Status: SHIPPED | OUTPATIENT
Start: 2022-10-31 | End: 2022-11-03

## 2022-10-31 RX ORDER — CANAGLIFLOZIN AND METFORMIN HYDROCHLORIDE 150; 1000 MG/1; MG/1
2 TABLET, FILM COATED, EXTENDED RELEASE ORAL DAILY
Qty: 180 TABLET | Refills: 1 | Status: SHIPPED | OUTPATIENT
Start: 2022-10-31

## 2022-10-31 NOTE — PATIENT INSTRUCTIONS
I added Actos 15 mg daily for the diabetes as it was uncontrolled    Take Cipro for 3 days for the urinary tract infection. I increased your thyroid medication to 50 mcg daily.

## 2022-12-16 ENCOUNTER — LAB ENCOUNTER (OUTPATIENT)
Dept: LAB | Age: 52
End: 2022-12-16
Attending: FAMILY MEDICINE
Payer: COMMERCIAL

## 2022-12-16 DIAGNOSIS — E11.65 UNCONTROLLED TYPE 2 DIABETES MELLITUS WITH HYPERGLYCEMIA (HCC): ICD-10-CM

## 2022-12-16 DIAGNOSIS — E03.9 ACQUIRED HYPOTHYROIDISM: ICD-10-CM

## 2022-12-16 LAB
ANION GAP SERPL CALC-SCNC: 8 MMOL/L (ref 0–18)
BUN BLD-MCNC: 16 MG/DL (ref 7–18)
BUN/CREAT SERPL: 21.3 (ref 10–20)
CALCIUM BLD-MCNC: 9.5 MG/DL (ref 8.5–10.1)
CHLORIDE SERPL-SCNC: 102 MMOL/L (ref 98–112)
CO2 SERPL-SCNC: 27 MMOL/L (ref 21–32)
CREAT BLD-MCNC: 0.75 MG/DL
EST. AVERAGE GLUCOSE BLD GHB EST-MCNC: 157 MG/DL (ref 68–126)
FASTING STATUS PATIENT QL REPORTED: YES
GFR SERPLBLD BASED ON 1.73 SQ M-ARVRAT: 97 ML/MIN/1.73M2 (ref 60–?)
GLUCOSE BLD-MCNC: 61 MG/DL (ref 70–99)
HBA1C MFR BLD: 7.1 % (ref ?–5.7)
OSMOLALITY SERPL CALC.SUM OF ELEC: 283 MOSM/KG (ref 275–295)
POTASSIUM SERPL-SCNC: 4.3 MMOL/L (ref 3.5–5.1)
SODIUM SERPL-SCNC: 137 MMOL/L (ref 136–145)
TSI SER-ACNC: 1 MIU/ML (ref 0.36–3.74)

## 2022-12-16 PROCEDURE — 36415 COLL VENOUS BLD VENIPUNCTURE: CPT

## 2022-12-16 PROCEDURE — 83036 HEMOGLOBIN GLYCOSYLATED A1C: CPT

## 2022-12-16 PROCEDURE — 80048 BASIC METABOLIC PNL TOTAL CA: CPT

## 2022-12-16 PROCEDURE — 84443 ASSAY THYROID STIM HORMONE: CPT

## 2022-12-16 PROCEDURE — 3051F HG A1C>EQUAL 7.0%<8.0%: CPT | Performed by: FAMILY MEDICINE

## 2022-12-19 ENCOUNTER — TELEPHONE (OUTPATIENT)
Dept: FAMILY MEDICINE CLINIC | Facility: CLINIC | Age: 52
End: 2022-12-19

## 2022-12-19 ENCOUNTER — OFFICE VISIT (OUTPATIENT)
Dept: FAMILY MEDICINE CLINIC | Facility: CLINIC | Age: 52
End: 2022-12-19
Payer: COMMERCIAL

## 2022-12-19 VITALS
HEIGHT: 63 IN | BODY MASS INDEX: 26.75 KG/M2 | SYSTOLIC BLOOD PRESSURE: 106 MMHG | TEMPERATURE: 97 F | DIASTOLIC BLOOD PRESSURE: 71 MMHG | WEIGHT: 151 LBS | HEART RATE: 81 BPM

## 2022-12-19 DIAGNOSIS — E11.65 UNCONTROLLED TYPE 2 DIABETES MELLITUS WITH HYPERGLYCEMIA (HCC): Primary | ICD-10-CM

## 2022-12-19 DIAGNOSIS — E03.9 ACQUIRED HYPOTHYROIDISM: ICD-10-CM

## 2022-12-19 PROCEDURE — 99214 OFFICE O/P EST MOD 30 MIN: CPT | Performed by: FAMILY MEDICINE

## 2022-12-19 PROCEDURE — 3074F SYST BP LT 130 MM HG: CPT | Performed by: FAMILY MEDICINE

## 2022-12-19 PROCEDURE — 3078F DIAST BP <80 MM HG: CPT | Performed by: FAMILY MEDICINE

## 2022-12-19 PROCEDURE — 3008F BODY MASS INDEX DOCD: CPT | Performed by: FAMILY MEDICINE

## 2022-12-19 RX ORDER — PIOGLITAZONEHYDROCHLORIDE 30 MG/1
30 TABLET ORAL DAILY
Qty: 90 TABLET | Refills: 1 | Status: SHIPPED | OUTPATIENT
Start: 2022-12-19

## 2022-12-19 NOTE — TELEPHONE ENCOUNTER
A prior authorization has been started for       pioglitazone (ACTOS) 30 MG Oral Tab, Take 1 tablet (30 mg total) by mouth daily. For diabetes. , Disp: 90 tablet, Rfl: 1        Key [id number]  Patient last name baron ROSALES 1970

## 2023-02-15 DIAGNOSIS — J30.89 OTHER ALLERGIC RHINITIS: ICD-10-CM

## 2023-02-15 RX ORDER — MONTELUKAST SODIUM 10 MG/1
10 TABLET ORAL NIGHTLY
Qty: 90 TABLET | Refills: 1 | Status: SHIPPED | OUTPATIENT
Start: 2023-02-15

## 2023-02-15 RX ORDER — ROSUVASTATIN CALCIUM 10 MG/1
10 TABLET, COATED ORAL NIGHTLY
Qty: 90 TABLET | Refills: 1 | Status: SHIPPED | OUTPATIENT
Start: 2023-02-15

## 2023-02-15 NOTE — TELEPHONE ENCOUNTER
Refill passed per [facility] protocol. Requested Prescriptions   Pending Prescriptions Disp Refills    MONTELUKAST 10 MG Oral Tab [Pharmacy Med Name: Montelukast Sodium 10 Mg Tab Auro] 90 tablet 0     Sig: TAKE ONE TABLET BY MOUTH NIGHTLY       Asthma & COPD Medication Protocol Passed - 2/15/2023  1:31 AM        Passed - In person appointment or virtual visit in the past 6 mos or appointment in next 3 mos     Recent Outpatient Visits              1 month ago Uncontrolled type 2 diabetes mellitus with hyperglycemia ([address]    [address] [facility], [address], [place], [place]    Office Visit    3 months ago [address] [facility], [address], [place], [place]    Office Visit    5 months ago Uncontrolled type 2 diabetes mellitus with hyperglycemia ([address]    [address], [address], [place], [place]    Office Visit    8 months ago Uncontrolled type 2 diabetes mellitus with hyperglycemia ([address]    [address], Ralph [place], DO    Office Visit    11 months ago Uncontrolled type 2 diabetes mellitus with hyperglycemia ([address]    [address], Ralph Ortega, [place], [place]    Office Visit                        ROSUVASTATIN 10 MG Oral Tab [Pharmacy Med Name: Rosuvastatin Calcium 10 Mg Tab Nort] 90 tablet 0     Sig: Take 1 tablet (10 mg total) by mouth nightly. For cholesterol.        Cholesterol Medication Protocol Passed - 2/15/2023  1:31 AM        Passed - ALT in past 12 months        Passed - LDL in past 12 months        Passed - Last ALT < 80     Lab Results   Component Value Date    ALT 43 08/26/2022             Passed - Last LDL < 130     Lab Results   Component Value Date    LDL 74 08/26/2022             Passed - In person appointment or virtual visit in the past 12 mos or appointment in next 3 mos     Recent Outpatient Visits              1 month ago Uncontrolled type 2 diabetes mellitus with hyperglycemia ([address]    [address], [place], [place]    Office Visit    3 months ago [address], [address], [place], DO    Office Visit    5 months ago Uncontrolled type 2 diabetes mellitus with hyperglycemia ([address]    [address] [facility], [address], [place], [place]    Office Visit    8 months ago Uncontrolled type 2 diabetes mellitus with hyperglycemia ([address]    [address], [address], [place] Arndee Journey, DO    Office Visit    11 months ago Uncontrolled type 2 diabetes mellitus with hyperglycemia ([address]    [address], [address], [address], [place], DO    Office Visit                         Recent Outpatient Visits              1 month ago Uncontrolled type 2 diabetes mellitus with hyperglycemia ([address]    [address], [address], [address], [place], DO    Office Visit    3 months ago [address], [address], [place], [place]    Office Visit    5 months ago Uncontrolled type 2 diabetes mellitus with hyperglycemia ([address]    [address], [address], [place] Randee Journey, DO    Office Visit    8 months ago Uncontrolled type 2 diabetes mellitus with hyperglycemia ([address]    [address], [address], [place] Rand[place], [place]    Office Visit    11 months ago Uncontrolled type 2 diabetes mellitus with hyperglycemia [facility])    [address], [address], [address], [place], [place]    Office Visit

## 2023-03-22 ENCOUNTER — LAB ENCOUNTER (OUTPATIENT)
Dept: LAB | Age: 53
End: 2023-03-22
Attending: FAMILY MEDICINE
Payer: COMMERCIAL

## 2023-03-22 DIAGNOSIS — E11.65 UNCONTROLLED TYPE 2 DIABETES MELLITUS WITH HYPERGLYCEMIA (HCC): ICD-10-CM

## 2023-03-22 LAB
ANION GAP SERPL CALC-SCNC: 9 MMOL/L (ref 0–18)
BUN BLD-MCNC: 11 MG/DL (ref 7–18)
BUN/CREAT SERPL: 14.5 (ref 10–20)
CALCIUM BLD-MCNC: 9.3 MG/DL (ref 8.5–10.1)
CHLORIDE SERPL-SCNC: 104 MMOL/L (ref 98–112)
CO2 SERPL-SCNC: 28 MMOL/L (ref 21–32)
CREAT BLD-MCNC: 0.76 MG/DL
EST. AVERAGE GLUCOSE BLD GHB EST-MCNC: 146 MG/DL (ref 68–126)
FASTING STATUS PATIENT QL REPORTED: YES
GFR SERPLBLD BASED ON 1.73 SQ M-ARVRAT: 95 ML/MIN/1.73M2 (ref 60–?)
GLUCOSE BLD-MCNC: 77 MG/DL (ref 70–99)
HBA1C MFR BLD: 6.7 % (ref ?–5.7)
OSMOLALITY SERPL CALC.SUM OF ELEC: 290 MOSM/KG (ref 275–295)
POTASSIUM SERPL-SCNC: 3.8 MMOL/L (ref 3.5–5.1)
SODIUM SERPL-SCNC: 141 MMOL/L (ref 136–145)

## 2023-03-22 PROCEDURE — 80048 BASIC METABOLIC PNL TOTAL CA: CPT

## 2023-03-22 PROCEDURE — 36415 COLL VENOUS BLD VENIPUNCTURE: CPT

## 2023-03-22 PROCEDURE — 83036 HEMOGLOBIN GLYCOSYLATED A1C: CPT

## 2023-03-22 PROCEDURE — 3044F HG A1C LEVEL LT 7.0%: CPT | Performed by: FAMILY MEDICINE

## 2023-03-31 ENCOUNTER — OFFICE VISIT (OUTPATIENT)
Dept: FAMILY MEDICINE CLINIC | Facility: CLINIC | Age: 53
End: 2023-03-31

## 2023-03-31 VITALS
HEART RATE: 81 BPM | HEIGHT: 63 IN | SYSTOLIC BLOOD PRESSURE: 97 MMHG | WEIGHT: 154 LBS | TEMPERATURE: 97 F | DIASTOLIC BLOOD PRESSURE: 65 MMHG | BODY MASS INDEX: 27.29 KG/M2

## 2023-03-31 DIAGNOSIS — I10 ESSENTIAL HYPERTENSION: ICD-10-CM

## 2023-03-31 DIAGNOSIS — E03.9 ACQUIRED HYPOTHYROIDISM: ICD-10-CM

## 2023-03-31 DIAGNOSIS — Z00.00 ADULT GENERAL MEDICAL EXAM: Primary | ICD-10-CM

## 2023-03-31 DIAGNOSIS — E11.9 CONTROLLED TYPE 2 DIABETES MELLITUS WITHOUT COMPLICATION, WITHOUT LONG-TERM CURRENT USE OF INSULIN (HCC): ICD-10-CM

## 2023-03-31 PROCEDURE — 3078F DIAST BP <80 MM HG: CPT | Performed by: FAMILY MEDICINE

## 2023-03-31 PROCEDURE — 99396 PREV VISIT EST AGE 40-64: CPT | Performed by: FAMILY MEDICINE

## 2023-03-31 PROCEDURE — 3008F BODY MASS INDEX DOCD: CPT | Performed by: FAMILY MEDICINE

## 2023-03-31 PROCEDURE — 3074F SYST BP LT 130 MM HG: CPT | Performed by: FAMILY MEDICINE

## 2023-03-31 RX ORDER — CANAGLIFLOZIN AND METFORMIN HYDROCHLORIDE 150; 1000 MG/1; MG/1
2 TABLET, FILM COATED, EXTENDED RELEASE ORAL DAILY
Qty: 180 TABLET | Refills: 1 | Status: SHIPPED | OUTPATIENT
Start: 2023-03-31

## 2023-03-31 RX ORDER — LEVOTHYROXINE SODIUM 0.05 MG/1
50 TABLET ORAL
Qty: 90 TABLET | Refills: 1 | Status: SHIPPED | OUTPATIENT
Start: 2023-03-31

## 2023-03-31 RX ORDER — GLIMEPIRIDE 4 MG/1
4 TABLET ORAL 2 TIMES DAILY
Qty: 180 TABLET | Refills: 1 | Status: SHIPPED | OUTPATIENT
Start: 2023-03-31

## 2023-03-31 RX ORDER — LISINOPRIL 5 MG/1
5 TABLET ORAL DAILY
Qty: 90 TABLET | Refills: 3 | Status: SHIPPED | OUTPATIENT
Start: 2023-03-31

## 2023-03-31 RX ORDER — PIOGLITAZONEHYDROCHLORIDE 30 MG/1
30 TABLET ORAL DAILY
Qty: 90 TABLET | Refills: 1 | Status: SHIPPED | OUTPATIENT
Start: 2023-03-31

## 2023-05-05 RX ORDER — ASPIRIN 81 MG/1
TABLET, COATED ORAL
Qty: 90 TABLET | Refills: 3 | Status: SHIPPED | OUTPATIENT
Start: 2023-05-05

## 2023-05-05 NOTE — TELEPHONE ENCOUNTER
Refill passed per [place] [employer] protocol.     Requested Prescriptions   Pending Prescriptions Disp Refills    ASPIRIN LOW DOSE 81 MG Oral Tab EC [Pharmacy Med Name: Aspirin Low Dose Ec 81 Mg Tab Rugb] 90 tablet 0     Sig: TAKE ONE TABLET BY MOUTH ONE TIME DAILY       Aspirin Protocol Passed - 5/5/2023  1:31 AM        Passed - In person appointment or virtual visit in the past 6 mos or appointment in next 3 mos     Recent Outpatient Visits              1 month ago Adult general medical exam    Ron Sims, [address], [place], DO    Office Visit    4 months ago Uncontrolled type 2 diabetes mellitus with hyperglycemia ([address]    [address], [address], [place], [place]    Office Visit    6 months ago [address], [address], [place], [place]    Office Visit    8 months ago Uncontrolled type 2 diabetes mellitus with hyperglycemia ([address]    [address], [address], [place], [place]    Office Visit    11 months ago Uncontrolled type 2 diabetes mellitus with hyperglycemia ([address]    [address], [address], [place], [place]    Office Visit          Future Appointments         Provider Department Appt Notes    In 2 months DO Ron Mcmahan Addison follow up                    Recent Outpatient Visits              1 month ago Adult general medical exam    [address], [address], [place], [place]    Office Visit    4 months ago Uncontrolled type 2 diabetes mellitus with hyperglycemia ([address]    [address], [address], [place], [place]    Office Visit    6 months ago [address], [address], [place], [place]    Office Visit    8 months ago Uncontrolled type 2 diabetes mellitus with hyperglycemia ([address]    [facility], [address], [address], [place]    Office Visit    11 months ago Uncontrolled type 2 diabetes mellitus with hyperglycemia [facility])    [address], [facility], [place] Mercado DO    Office Visit            Future Appointments         Provider Department Appt Notes    In 2 months DO Gisell Casey Addison follow up

## 2023-07-22 ENCOUNTER — LAB ENCOUNTER (OUTPATIENT)
Dept: LAB | Age: 53
End: 2023-07-22
Attending: FAMILY MEDICINE
Payer: COMMERCIAL

## 2023-07-22 ENCOUNTER — OFFICE VISIT (OUTPATIENT)
Dept: FAMILY MEDICINE CLINIC | Facility: CLINIC | Age: 53
End: 2023-07-22

## 2023-07-22 VITALS
SYSTOLIC BLOOD PRESSURE: 102 MMHG | DIASTOLIC BLOOD PRESSURE: 70 MMHG | WEIGHT: 159 LBS | HEIGHT: 63 IN | TEMPERATURE: 98 F | HEART RATE: 70 BPM | BODY MASS INDEX: 28.17 KG/M2

## 2023-07-22 DIAGNOSIS — J30.89 OTHER ALLERGIC RHINITIS: ICD-10-CM

## 2023-07-22 DIAGNOSIS — E78.2 MIXED HYPERLIPIDEMIA: ICD-10-CM

## 2023-07-22 DIAGNOSIS — E03.9 ACQUIRED HYPOTHYROIDISM: ICD-10-CM

## 2023-07-22 DIAGNOSIS — I10 ESSENTIAL HYPERTENSION: ICD-10-CM

## 2023-07-22 DIAGNOSIS — E11.9 CONTROLLED TYPE 2 DIABETES MELLITUS WITHOUT COMPLICATION, WITHOUT LONG-TERM CURRENT USE OF INSULIN (HCC): ICD-10-CM

## 2023-07-22 DIAGNOSIS — E11.9 CONTROLLED TYPE 2 DIABETES MELLITUS WITHOUT COMPLICATION, WITHOUT LONG-TERM CURRENT USE OF INSULIN (HCC): Primary | ICD-10-CM

## 2023-07-22 DIAGNOSIS — Z12.31 VISIT FOR SCREENING MAMMOGRAM: ICD-10-CM

## 2023-07-22 LAB
ALBUMIN SERPL-MCNC: 3.8 G/DL (ref 3.4–5)
ALBUMIN/GLOB SERPL: 1.2 {RATIO} (ref 1–2)
ALP LIVER SERPL-CCNC: 55 U/L
ALT SERPL-CCNC: 32 U/L
ANION GAP SERPL CALC-SCNC: 4 MMOL/L (ref 0–18)
AST SERPL-CCNC: 19 U/L (ref 15–37)
BASOPHILS # BLD AUTO: 0.02 X10(3) UL (ref 0–0.2)
BASOPHILS NFR BLD AUTO: 0.3 %
BILIRUB SERPL-MCNC: 0.7 MG/DL (ref 0.1–2)
BUN BLD-MCNC: 12 MG/DL (ref 7–18)
CALCIUM BLD-MCNC: 9 MG/DL (ref 8.5–10.1)
CHLORIDE SERPL-SCNC: 107 MMOL/L (ref 98–112)
CHOLEST SERPL-MCNC: 176 MG/DL (ref ?–200)
CO2 SERPL-SCNC: 26 MMOL/L (ref 21–32)
CREAT BLD-MCNC: 0.73 MG/DL
CREAT UR-SCNC: 29.8 MG/DL
EGFRCR SERPLBLD CKD-EPI 2021: 100 ML/MIN/1.73M2 (ref 60–?)
EOSINOPHIL # BLD AUTO: 0.04 X10(3) UL (ref 0–0.7)
EOSINOPHIL NFR BLD AUTO: 0.5 %
ERYTHROCYTE [DISTWIDTH] IN BLOOD BY AUTOMATED COUNT: 13.4 %
EST. AVERAGE GLUCOSE BLD GHB EST-MCNC: 148 MG/DL (ref 68–126)
FASTING PATIENT LIPID ANSWER: YES
FASTING STATUS PATIENT QL REPORTED: YES
GLOBULIN PLAS-MCNC: 3.1 G/DL (ref 2.8–4.4)
GLUCOSE BLD-MCNC: 63 MG/DL (ref 70–99)
HBA1C MFR BLD: 6.8 % (ref ?–5.7)
HCT VFR BLD AUTO: 36.8 %
HDLC SERPL-MCNC: 43 MG/DL (ref 40–59)
HGB BLD-MCNC: 11.6 G/DL
IMM GRANULOCYTES # BLD AUTO: 0.02 X10(3) UL (ref 0–1)
IMM GRANULOCYTES NFR BLD: 0.3 %
LDLC SERPL CALC-MCNC: 91 MG/DL (ref ?–100)
LYMPHOCYTES # BLD AUTO: 2.52 X10(3) UL (ref 1–4)
LYMPHOCYTES NFR BLD AUTO: 33.1 %
MCH RBC QN AUTO: 29.4 PG (ref 26–34)
MCHC RBC AUTO-ENTMCNC: 31.5 G/DL (ref 31–37)
MCV RBC AUTO: 93.4 FL
MICROALBUMIN UR-MCNC: <0.5 MG/DL
MONOCYTES # BLD AUTO: 0.47 X10(3) UL (ref 0.1–1)
MONOCYTES NFR BLD AUTO: 6.2 %
NEUTROPHILS # BLD AUTO: 4.55 X10 (3) UL (ref 1.5–7.7)
NEUTROPHILS # BLD AUTO: 4.55 X10(3) UL (ref 1.5–7.7)
NEUTROPHILS NFR BLD AUTO: 59.6 %
NONHDLC SERPL-MCNC: 133 MG/DL (ref ?–130)
OSMOLALITY SERPL CALC.SUM OF ELEC: 282 MOSM/KG (ref 275–295)
PLATELET # BLD AUTO: 312 10(3)UL (ref 150–450)
POTASSIUM SERPL-SCNC: 3.8 MMOL/L (ref 3.5–5.1)
PROT SERPL-MCNC: 6.9 G/DL (ref 6.4–8.2)
RBC # BLD AUTO: 3.94 X10(6)UL
SODIUM SERPL-SCNC: 137 MMOL/L (ref 136–145)
TRIGL SERPL-MCNC: 253 MG/DL (ref 30–149)
TSI SER-ACNC: 1.13 MIU/ML (ref 0.36–3.74)
VLDLC SERPL CALC-MCNC: 41 MG/DL (ref 0–30)
WBC # BLD AUTO: 7.6 X10(3) UL (ref 4–11)

## 2023-07-22 PROCEDURE — 36415 COLL VENOUS BLD VENIPUNCTURE: CPT

## 2023-07-22 PROCEDURE — 80053 COMPREHEN METABOLIC PANEL: CPT

## 2023-07-22 PROCEDURE — 84443 ASSAY THYROID STIM HORMONE: CPT

## 2023-07-22 PROCEDURE — 80061 LIPID PANEL: CPT

## 2023-07-22 PROCEDURE — 82570 ASSAY OF URINE CREATININE: CPT

## 2023-07-22 PROCEDURE — 82043 UR ALBUMIN QUANTITATIVE: CPT

## 2023-07-22 PROCEDURE — 99214 OFFICE O/P EST MOD 30 MIN: CPT | Performed by: FAMILY MEDICINE

## 2023-07-22 PROCEDURE — 83036 HEMOGLOBIN GLYCOSYLATED A1C: CPT

## 2023-07-22 PROCEDURE — 85025 COMPLETE CBC W/AUTO DIFF WBC: CPT

## 2023-07-22 PROCEDURE — 3008F BODY MASS INDEX DOCD: CPT | Performed by: FAMILY MEDICINE

## 2023-07-22 PROCEDURE — 3061F NEG MICROALBUMINURIA REV: CPT | Performed by: FAMILY MEDICINE

## 2023-07-22 PROCEDURE — 3078F DIAST BP <80 MM HG: CPT | Performed by: FAMILY MEDICINE

## 2023-07-22 PROCEDURE — 3074F SYST BP LT 130 MM HG: CPT | Performed by: FAMILY MEDICINE

## 2023-07-22 RX ORDER — ROSUVASTATIN CALCIUM 10 MG/1
10 TABLET, COATED ORAL NIGHTLY
Qty: 90 TABLET | Refills: 1 | Status: SHIPPED | OUTPATIENT
Start: 2023-07-22

## 2023-07-22 RX ORDER — MOMETASONE FUROATE 50 UG/1
2 SPRAY, METERED NASAL DAILY
Qty: 3 EACH | Refills: 1 | Status: SHIPPED | OUTPATIENT
Start: 2023-07-22

## 2023-09-24 DIAGNOSIS — E03.9 ACQUIRED HYPOTHYROIDISM: ICD-10-CM

## 2023-09-24 DIAGNOSIS — E11.9 CONTROLLED TYPE 2 DIABETES MELLITUS WITHOUT COMPLICATION, WITHOUT LONG-TERM CURRENT USE OF INSULIN (HCC): ICD-10-CM

## 2023-09-25 RX ORDER — CANAGLIFLOZIN AND METFORMIN HYDROCHLORIDE 150; 1000 MG/1; MG/1
2 TABLET, FILM COATED, EXTENDED RELEASE ORAL DAILY
Qty: 180 TABLET | Refills: 3 | Status: SHIPPED | OUTPATIENT
Start: 2023-09-25

## 2023-09-25 RX ORDER — GLIMEPIRIDE 4 MG/1
4 TABLET ORAL 2 TIMES DAILY
Qty: 180 TABLET | Refills: 3 | Status: SHIPPED | OUTPATIENT
Start: 2023-09-25

## 2023-09-25 RX ORDER — LEVOTHYROXINE SODIUM 0.05 MG/1
50 TABLET ORAL
Qty: 90 TABLET | Refills: 3 | Status: SHIPPED | OUTPATIENT
Start: 2023-09-25

## 2023-09-25 NOTE — TELEPHONE ENCOUNTER
Refill passed per [place] [employer] protocol. Requested Prescriptions   Pending Prescriptions Disp Refills    GLIMEPIRIDE 4 MG Oral Tab [Pharmacy Med Name: Glimepiride 4 Mg Tab ] 180 tablet 0     Sig: Take 1 tablet (4 mg total) by mouth in the morning and 1 tablet (4 mg total) before bedtime. For diabetes.        Diabetes Medication Protocol Passed - 9/24/2023  1:31 AM        Passed - Last A1C < 7.5 and within past 6 months     Lab Results   Component Value Date    A1C 6.8 (H) 07/22/2023             Passed - In person appointment or virtual visit in the past 6 mos or appointment in next 3 mos     Recent Outpatient Visits              2 months ago Controlled type 2 diabetes mellitus without complication, without long-term current use of insulin ([address]    [address], [address], [address], [place], [place]    Office Visit    5 months ago Adult general medical exam    [address], [address], [address], [place], [place]    Office Visit    9 months ago Uncontrolled type 2 diabetes mellitus with hyperglycemia ([address]    [address], [address], [address], [place], [place]    Office Visit    10 months ago [address], [address], [place], [place]    Office Visit    1 year ago Uncontrolled type 2 diabetes mellitus with hyperglycemia ([address]    [address], [address], [place] Merc[place], [place]    Office Visit          Future Appointments         Provider Department Appt Notes    In 5 days ADO DEXA RM1; ADO [address]EN[address] or GFRNAA > 50     GFR Evaluation  EGFRCR: 100 , resulted on 7/22/2023          Passed - GFR in the past 12 months          LEVOTHYROXINE 50 MCG Oral Tab [Pharmacy Med Name: Levothyroxine Sodium 50 Mcg Tab Lupi] 90 tablet 0     Sig: Take 1 tablet (50 mcg total) by mouth before breakfast.       Thyroid Medication Protocol Passed - 9/24/2023  1:31 AM        Passed - TSH in past 12 months        Passed - Last TSH value is normal     Lab Results   Component Value Date    TSH 1.130 07/22/2023                 Passed - In person appointment or virtual visit in the past 12 mos or appointment in next 3 mos     Recent Outpatient Visits              2 months ago Controlled type 2 diabetes mellitus without complication, without long-term current use of insulin ([address]    [address], [address], [place], [place]    Office Visit    5 months ago Adult general medical exam    [address] [facility], [address], [place], [place]    Office Visit    9 months ago Uncontrolled type 2 diabetes mellitus with hyperglycemia ([address]    [address], [address], [place], [place]    Office Visit    10 months ago [address], [address], [place], [place]    Office Visit    1 year ago Uncontrolled type 2 diabetes mellitus with hyperglycemia ([address]    [address], [place], DO    Office Visit          Future Appointments         Provider Department Appt Notes    In 5 days ADO DEXA RM1; ADO [address]EN[address] [facility] [place]                       INVOKAMET -1000 MG Oral Tablet 24 Hr [Pharmacy Med Name: Invokamet Xr 24hr 150-1,000 Mg Tab Jans] 180 tablet 0     Sig: Take 2 tablets by mouth daily. For diabetes.        Diabetes Medication Protocol Passed - 9/24/2023  1:31 AM        Passed - Last A1C < 7.5 and within past 6 months     Lab Results   Component Value Date    A1C 6.8 (H) 07/22/2023             Passed - In person appointment or virtual visit in the past 6 mos or appointment in next 3 mos     Recent Outpatient Visits              2 months ago Controlled type 2 diabetes mellitus without complication, without long-term current use of insulin [facility])    [address], Ralph Ritter, [place], [place]    Office Visit    5 months ago Adult general medical exam    [address] [facility] [address] [facility], [address], [place], [place]    Office Visit    9 months ago Uncontrolled type 2 diabetes mellitus with hyperglycemia ([address]    [address], [address], [place], [place]    Office Visit    10 months ago [address], [address], [place], [place]    Office Visit    1 year ago Uncontrolled type 2 diabetes mellitus with hyperglycemia ([address]    [address], [place], [place]    Office Visit          Future Appointments         Provider Department Appt Notes    In 5 days ADO DEXA RM1; ADO HENRY [address] or GFRNAA > 50     GFR Evaluation  EGFRCR: 100 , resulted on 7/22/2023          Passed - GFR in the past 12 months           Recent Outpatient Visits              2 months ago Controlled type 2 diabetes mellitus without complication, without long-term current use of insulin ([address]    [address], [address], [place], [place]    Office Visit    5 months ago Adult general medical exam    [address], [address], [place], [place]    Office Visit    9 months ago Uncontrolled type 2 diabetes mellitus with hyperglycemia ([address]    [address], [address], [place], [place]    Office Visit    10 months ago [address], [address], [place], [place]    Office Visit    1 year ago Uncontrolled type 2 diabetes mellitus with hyperglycemia ([address]    [address], [place], [place]    Office Visit          Future Appointments         Provider Department Appt Notes    In 5 days ADO DEXA RM1; ADO HENRY [address] [facility]

## 2023-09-25 NOTE — TELEPHONE ENCOUNTER
Refill passed per [place], [facility] protocol. Requested Prescriptions   Pending Prescriptions Disp Refills    GLIMEPIRIDE 4 MG Oral Tab [Pharmacy Med Name: Glimepiride 4 Mg Tab ] 180 tablet 0     Sig: Take 1 tablet (4 mg total) by mouth in the morning and 1 tablet (4 mg total) before bedtime. For diabetes.        Diabetes Medication Protocol Passed - 9/24/2023  1:31 AM        Passed - Last A1C < 7.5 and within past 6 months     Lab Results   Component Value Date    A1C 6.8 (H) 07/22/2023             Passed - In person appointment or virtual visit in the past 6 mos or appointment in next 3 mos     Recent Outpatient Visits              2 months ago Controlled type 2 diabetes mellitus without complication, without long-term current use of insulin ([address]    [address], [address], [place], [place]    Office Visit    5 months ago Adult general medical exam    [address], [address], [place], [place]    Office Visit    9 months ago Uncontrolled type 2 diabetes mellitus with hyperglycemia ([address]    [address], [address], [place], [place]    Office Visit    10 months ago [address], [address], [place], [place]    Office Visit    1 year ago Uncontrolled type 2 diabetes mellitus with hyperglycemia ([address]    Kermit Rangel, [address], [place] Mackenzie Dines, [place]    Office Visit          Future Appointments         Provider Department Appt Notes    In 5 days ADO DEXA RM1; ADO HENRY [address] [facility] or GFRNAA > 50     GFR Evaluation  EGFRCR: 100 , resulted on 7/22/2023          Passed - GFR in the past 12 months          LEVOTHYROXINE 50 MCG Oral Tab [Pharmacy Med Name: Levothyroxine Sodium 50 Mcg Tab Lupi] 90 tablet 0     Sig: Take 1 tablet (50 mcg total) by mouth before breakfast.       Thyroid Medication Protocol Passed - 9/24/2023  1:31 AM        Passed - TSH in past 12 months        Passed - Last TSH value is normal     Lab Results   Component Value Date    TSH 1.130 07/22/2023                 Passed - In person appointment or virtual visit in the past 12 mos or appointment in next 3 mos     Recent Outpatient Visits              2 months ago Controlled type 2 diabetes mellitus without complication, without long-term current use of insulin ([address]    [address]ortensia Quevedo, [address], [place], [place]    Office Visit    5 months ago Adult general medical exam    [address], [address], [place], [place]    Office Visit    9 months ago Uncontrolled type 2 diabetes mellitus with hyperglycemia ([address]    [address] Que[address]o, [address], [place], [place]    Office Visit    10 months ago [address], [address], [place], [place]    Office Visit    1 year ago Uncontrolled type 2 diabetes mellitus with hyperglycemia ([address]    [address] Que[address], Addison Elly Olszewski, DO    Office Visit          Future Appointments         Provider Department Appt Notes    In 5 days ADO DEXA RM1; ADO HENRY [id number] [facility] [place]                       INVOKAMET -1000 MG Oral Tablet 24 Hr [Pharmacy Med Name: Invokamet Xr 24hr 150-1,000 Mg Tab Jans] 180 tablet 0     Sig: Take 2 tablets by mouth daily. For diabetes.        Diabetes Medication Protocol Passed - 9/24/2023  1:31 AM        Passed - Last A1C < 7.5 and within past 6 months     Lab Results   Component Value Date    A1C 6.8 (H) 07/22/2023             Passed - In person appointment or virtual visit in the past 6 mos or appointment in next 3 mos     Recent Outpatient Visits              2 months ago Controlled type 2 diabetes mellitus without complication, without long-term current use of insulin [facility])    [address], [place] Ritter, [place], [place]    Office Visit    5 months ago Adult general medical exam    Alvester Gell, [address], [place], [place]    Office Visit    9 months ago Uncontrolled type 2 diabetes mellitus with hyperglycemia ([address]    [address], [facility], [address], [place], [place]    Office Visit    10 months ago [address], [address], [place], [place]    Office Visit    1 year ago Uncontrolled type 2 diabetes mellitus with hyperglycemia ([address]    [address] [facility], [place] Carrera, DO    Office Visit          Future Appointments         Provider Department Appt Notes    In 5 days ADO DEXA RM1; ADO HENRY [address] or GFRNAA > 50     GFR Evaluation  EGFRCR: 100 , resulted on 7/22/2023          Passed - GFR in the past 12 months           Recent Outpatient Visits              2 months ago Controlled type 2 diabetes mellitus without complication, without long-term current use of insulin ([address]    [address] [facility], [address], [place], [place]    Office Visit    5 months ago Adult general medical exam    Alvester Gell, [address], [place], [place]    Office Visit    9 months ago Uncontrolled type 2 diabetes mellitus with hyperglycemia ([address]    [address] [facility], [address], [place], [place]    Office Visit    10 months ago [address], [address], [place], [place]    Office Visit    1 year ago Uncontrolled type 2 diabetes mellitus with hyperglycemia ([address]    [address] [facility], [place] Carrera, DO    Office Visit          Future Appointments         Provider Department Appt Notes    In 5 days ADO DEXA RM1; ADO HENRY [address] [facility]

## 2023-09-30 ENCOUNTER — HOSPITAL ENCOUNTER (OUTPATIENT)
Dept: MAMMOGRAPHY | Age: 53
Discharge: HOME | End: 2023-09-30
Attending: FAMILY MEDICINE
Payer: COMMERCIAL

## 2023-09-30 DIAGNOSIS — Z12.31 VISIT FOR SCREENING MAMMOGRAM: ICD-10-CM

## 2023-09-30 PROCEDURE — 77063 BREAST TOMOSYNTHESIS BI: CPT | Performed by: FAMILY MEDICINE

## 2023-09-30 PROCEDURE — 77067 SCR MAMMO BI INCL CAD: CPT | Performed by: FAMILY MEDICINE

## 2023-11-11 ENCOUNTER — LAB ENCOUNTER (OUTPATIENT)
Dept: LAB | Age: 53
End: 2023-11-11
Attending: FAMILY MEDICINE
Payer: COMMERCIAL

## 2023-11-11 ENCOUNTER — OFFICE VISIT (OUTPATIENT)
Dept: FAMILY MEDICINE CLINIC | Facility: CLINIC | Age: 53
End: 2023-11-11

## 2023-11-11 VITALS
DIASTOLIC BLOOD PRESSURE: 63 MMHG | HEART RATE: 75 BPM | SYSTOLIC BLOOD PRESSURE: 103 MMHG | HEIGHT: 63 IN | BODY MASS INDEX: 27.46 KG/M2 | WEIGHT: 155 LBS | TEMPERATURE: 98 F

## 2023-11-11 DIAGNOSIS — E78.2 MIXED HYPERLIPIDEMIA: ICD-10-CM

## 2023-11-11 DIAGNOSIS — E03.9 ACQUIRED HYPOTHYROIDISM: ICD-10-CM

## 2023-11-11 DIAGNOSIS — E11.9 CONTROLLED TYPE 2 DIABETES MELLITUS WITHOUT COMPLICATION, WITHOUT LONG-TERM CURRENT USE OF INSULIN (HCC): ICD-10-CM

## 2023-11-11 DIAGNOSIS — J30.89 OTHER ALLERGIC RHINITIS: ICD-10-CM

## 2023-11-11 DIAGNOSIS — E11.9 CONTROLLED TYPE 2 DIABETES MELLITUS WITHOUT COMPLICATION, WITHOUT LONG-TERM CURRENT USE OF INSULIN (HCC): Primary | ICD-10-CM

## 2023-11-11 LAB
ANION GAP SERPL CALC-SCNC: 11 MMOL/L (ref 0–18)
BASOPHILS # BLD AUTO: 0.03 X10(3) UL (ref 0–0.2)
BASOPHILS NFR BLD AUTO: 0.4 %
BUN BLD-MCNC: 16 MG/DL (ref 9–23)
BUN/CREAT SERPL: 20.5 (ref 10–20)
CALCIUM BLD-MCNC: 9.8 MG/DL (ref 8.7–10.4)
CHLORIDE SERPL-SCNC: 105 MMOL/L (ref 98–112)
CO2 SERPL-SCNC: 25 MMOL/L (ref 21–32)
CREAT BLD-MCNC: 0.78 MG/DL
DEPRECATED RDW RBC AUTO: 42.9 FL (ref 35.1–46.3)
EGFRCR SERPLBLD CKD-EPI 2021: 92 ML/MIN/1.73M2 (ref 60–?)
EOSINOPHIL # BLD AUTO: 0.06 X10(3) UL (ref 0–0.7)
EOSINOPHIL NFR BLD AUTO: 0.8 %
ERYTHROCYTE [DISTWIDTH] IN BLOOD BY AUTOMATED COUNT: 13.2 % (ref 11–15)
EST. AVERAGE GLUCOSE BLD GHB EST-MCNC: 148 MG/DL (ref 68–126)
FASTING STATUS PATIENT QL REPORTED: YES
GLUCOSE BLD-MCNC: 70 MG/DL (ref 70–99)
HBA1C MFR BLD: 6.8 % (ref ?–5.7)
HCT VFR BLD AUTO: 37.1 %
HGB BLD-MCNC: 12.6 G/DL
IMM GRANULOCYTES # BLD AUTO: 0.02 X10(3) UL (ref 0–1)
IMM GRANULOCYTES NFR BLD: 0.3 %
LYMPHOCYTES # BLD AUTO: 2.88 X10(3) UL (ref 1–4)
LYMPHOCYTES NFR BLD AUTO: 36.8 %
MCH RBC QN AUTO: 30.1 PG (ref 26–34)
MCHC RBC AUTO-ENTMCNC: 34 G/DL (ref 31–37)
MCV RBC AUTO: 88.5 FL
MONOCYTES # BLD AUTO: 0.51 X10(3) UL (ref 0.1–1)
MONOCYTES NFR BLD AUTO: 6.5 %
NEUTROPHILS # BLD AUTO: 4.32 X10 (3) UL (ref 1.5–7.7)
NEUTROPHILS # BLD AUTO: 4.32 X10(3) UL (ref 1.5–7.7)
NEUTROPHILS NFR BLD AUTO: 55.2 %
OSMOLALITY SERPL CALC.SUM OF ELEC: 292 MOSM/KG (ref 275–295)
PLATELET # BLD AUTO: 295 10(3)UL (ref 150–450)
POTASSIUM SERPL-SCNC: 4 MMOL/L (ref 3.5–5.1)
RBC # BLD AUTO: 4.19 X10(6)UL
SODIUM SERPL-SCNC: 141 MMOL/L (ref 136–145)
WBC # BLD AUTO: 7.8 X10(3) UL (ref 4–11)

## 2023-11-11 PROCEDURE — 80048 BASIC METABOLIC PNL TOTAL CA: CPT

## 2023-11-11 PROCEDURE — 3044F HG A1C LEVEL LT 7.0%: CPT | Performed by: FAMILY MEDICINE

## 2023-11-11 PROCEDURE — 36415 COLL VENOUS BLD VENIPUNCTURE: CPT

## 2023-11-11 PROCEDURE — 83036 HEMOGLOBIN GLYCOSYLATED A1C: CPT

## 2023-11-11 PROCEDURE — 3008F BODY MASS INDEX DOCD: CPT | Performed by: FAMILY MEDICINE

## 2023-11-11 PROCEDURE — 3078F DIAST BP <80 MM HG: CPT | Performed by: FAMILY MEDICINE

## 2023-11-11 PROCEDURE — 3074F SYST BP LT 130 MM HG: CPT | Performed by: FAMILY MEDICINE

## 2023-11-11 PROCEDURE — 99214 OFFICE O/P EST MOD 30 MIN: CPT | Performed by: FAMILY MEDICINE

## 2023-11-11 PROCEDURE — 85025 COMPLETE CBC W/AUTO DIFF WBC: CPT

## 2023-11-11 RX ORDER — ROSUVASTATIN CALCIUM 10 MG/1
10 TABLET, COATED ORAL NIGHTLY
Qty: 90 TABLET | Refills: 1 | Status: SHIPPED | OUTPATIENT
Start: 2023-11-11

## 2023-11-11 RX ORDER — MOMETASONE FUROATE 50 UG/1
2 SPRAY, METERED NASAL DAILY
Qty: 3 EACH | Refills: 1 | Status: SHIPPED | OUTPATIENT
Start: 2023-11-11

## 2023-11-13 DIAGNOSIS — E11.9 TYPE 2 DIABETES MELLITUS WITHOUT COMPLICATION, WITHOUT LONG-TERM CURRENT USE OF INSULIN (HCC): Primary | ICD-10-CM

## 2023-11-14 NOTE — TELEPHONE ENCOUNTER
Refill passed per [place], [facility] protocol.   Requested Prescriptions   Pending Prescriptions Disp Refills    ACCU-CHEK SOFTCLIX LANCETS Does not apply Misc [Pharmacy Med Name: Accu-Chek Softclix Lancets Mis Roch]  0     Sig: TEST ONCE DAILY       Diabetic Supplies Protocol Passed - 11/13/2023 12:05 PM        Passed - In person appointment or virtual visit in the past 12 mos or appointment in next 3 mos     Recent Outpatient Visits              3 days ago Controlled type 2 diabetes mellitus without complication, without long-term current use of insulin ([facility]    [address], [address], [place] David [place], DO    Office Visit    3 months ago Controlled type 2 diabetes mellitus without complication, without long-term current use of insulin ([facility]    [address], [address], [address], [place], [place]    Office Visit    7 months ago Adult general medical exam    [address] [facility], [address], [place], [place]    Office Visit    11 months ago Uncontrolled type 2 diabetes mellitus with hyperglycemia ([facility]    [address], [address], [address], [place], [place]    Office Visit    1 year ago [address], [address], [place], [place]    Office Visit          Future Appointments         Provider Department Appt Notes    Tomorrow EC ALLISON FM RN [address] [facility], [place] flu shot                  Recent Outpatient Visits              3 days ago Controlled type 2 diabetes mellitus without complication, without long-term current use of insulin ([facility]    [address], [address], [place] David Crystal, DO    Office Visit    3 months ago Controlled type 2 diabetes mellitus without complication, without long-term current use of insulin ([address]    [address], [address], [place] David Crystal, DO    Office Visit    7 months ago Adult general medical exam    [address], [place] Barclay, DO    Office Visit    11 months ago Uncontrolled type 2 diabetes mellitus with hyperglycemia ([facility]    [address], [address], [place], DO    Office Visit    1 year ago [address] [facility], [place] Rojas, DO    Office Visit          Future Appointments         Provider Department Appt Notes    Tomorrow EC RALPH JAMES RN [address], [place] flu shot no

## 2023-11-15 ENCOUNTER — IMMUNIZATION (OUTPATIENT)
Dept: FAMILY MEDICINE CLINIC | Facility: CLINIC | Age: 53
End: 2023-11-15

## 2023-11-15 DIAGNOSIS — Z23 NEED FOR VACCINATION: Primary | ICD-10-CM

## 2023-11-15 PROCEDURE — 90471 IMMUNIZATION ADMIN: CPT | Performed by: FAMILY MEDICINE

## 2023-11-15 PROCEDURE — 90686 IIV4 VACC NO PRSV 0.5 ML IM: CPT | Performed by: FAMILY MEDICINE

## 2023-11-16 RX ORDER — LANCETS 30 GAUGE
EACH MISCELLANEOUS
Qty: 100 EACH | Refills: 3 | Status: SHIPPED | OUTPATIENT
Start: 2023-11-16

## 2023-11-16 RX ORDER — BLOOD SUGAR DIAGNOSTIC
STRIP MISCELLANEOUS
Qty: 100 STRIP | Refills: 3 | Status: SHIPPED | OUTPATIENT
Start: 2023-11-16

## 2023-11-16 RX ORDER — BLOOD-GLUCOSE METER
EACH MISCELLANEOUS
Qty: 100 STRIP | Refills: 3 | Status: SHIPPED | OUTPATIENT
Start: 2023-11-16

## 2023-11-16 RX ORDER — LANCETS
EACH MISCELLANEOUS
Qty: 100 EACH | Refills: 3 | Status: SHIPPED | OUTPATIENT
Start: 2023-11-16

## 2023-11-16 NOTE — TELEPHONE ENCOUNTER
[address] Fany  called, verified patient's Name and . She states One Touch Verio is the brand that is covered by patient's insurance. New prescription is needed for meter, strips and lancets. Medication pended to run through protocol.

## 2023-11-16 NOTE — TELEPHONE ENCOUNTER
Message noted. LOV 11/11/23    Will refill per triage protocol. Patient contacted and informed.      Diabetic Supplies  Protocol Criteria:  Appointment scheduled in past 12 months or the next 3 months

## 2023-11-16 NOTE — TELEPHONE ENCOUNTER
Per  of patient, patient needs a refill also on her Glucose Blood (ACCU-CHEK ALY PLUS) In Vitro Strip  and her Lancet per  patient is totally out of med.     Current Outpatient Medications   Medication Sig Dispense Refill                                                                          Accu-Chek Softclix Lancets Does not apply Misc TEST ONCE DAILY 100 each 3    Glucose Blood (ACCU-CHEK ALY PLUS) In Vitro Strip TEST ONCE DAILY 100 strip 3

## 2023-11-16 NOTE — TELEPHONE ENCOUNTER
Refill passed per [place], [facility] protocol. Requested Prescriptions   Pending Prescriptions Disp Refills    Blood Glucose Monitoring Suppl Does not apply Kit 1 kit 0     Sig: Check blood sugar once daily. Diabetic Supplies Protocol Passed - 11/16/2023  3:02 PM        Passed - In person appointment or virtual visit in the past 12 mos or appointment in next 3 mos     Recent Outpatient Visits              5 days ago Controlled type 2 diabetes mellitus without complication, without long-term current use of insulin ([address]    [address] [facility], [place] Rocio [place], DO    Office Visit    3 months ago Controlled type 2 diabetes mellitus without complication, without long-term current use of insulin ([address]    [address], [address], [place], DO    Office Visit    7 months ago Adult general medical exam    [address] [place] [address], [address], [place], [place]    Office Visit    11 months ago Uncontrolled type 2 diabetes mellitus with hyperglycemia ([address]    [address], [address], [place], [place]    Office Visit    1 year ago [address] [facility], [facility], [place], [place]    Office Visit                        Lancets Does not apply Misc 100 each 3     Sig: Check blood sugar daily.        Diabetic Supplies Protocol Passed - 11/16/2023  3:02 PM        Passed - In person appointment or virtual visit in the past 12 mos or appointment in next 3 mos     Recent Outpatient Visits              5 days ago Controlled type 2 diabetes mellitus without complication, without long-term current use of insulin ([address]    [address], [address], [place], DO    Office Visit    3 months ago Controlled type 2 diabetes mellitus without complication, without long-term current use of insulin [facility])    [address], [address], [place], DO    Office Visit    7 months ago Adult general medical exam    [address] [facility], [address], [place], [place]    Office Visit    11 months ago Uncontrolled type 2 diabetes mellitus with hyperglycemia ([address]    [address], [address], [place], [place]    Office Visit    1 year ago [address] [facility], [facility], [place], [place]    Office Visit                        Glucose Blood (ONETOUCH VERIO) In Vitro Strip 100 strip 3     Sig: Check blood sugar daily.        Diabetic Supplies Protocol Passed - 11/16/2023  3:02 PM        Passed - In person appointment or virtual visit in the past 12 mos or appointment in next 3 mos     Recent Outpatient Visits              5 days ago Controlled type 2 diabetes mellitus without complication, without long-term current use of insulin ([address]    [address] [facility], [place] Mann Dines, DO    Office Visit    3 months ago Controlled type 2 diabetes mellitus without complication, without long-term current use of insulin ([address]    [address], [address], [place], [place]    Office Visit    7 months ago Adult general medical exam    [address] [facility], [address], [place], [place]    Office Visit    11 months ago Uncontrolled type 2 diabetes mellitus with hyperglycemia ([address]    Kermit Rangel [address], [place] Mackenzie Dines, DO    Office Visit    1 year ago [address] [facility], [facility], [place], [place]    Office Visit                       Signed Prescriptions Disp Refills    Accu-Chek Softclix Lancets Does not apply Misc 100 each 3     Sig: TEST ONCE DAILY       Diabetic Supplies Protocol Passed - 11/16/2023 11:13 AM        Passed - In person appointment or virtual visit in the past 12 mos or appointment in next 3 mos     Recent Outpatient Visits              5 days ago Controlled type 2 diabetes mellitus without complication, without long-term current use of insulin ([address]    [address] [facility], Addison Elly Olszewski, DO    Office Visit    3 months ago Controlled type 2 diabetes mellitus without complication, without long-term current use of insulin ([address]    [address], [address], [address], [place], [place]    Office Visit    7 months ago Adult general medical exam    [address] [place] [address] [address], [place], [place]    Office Visit    11 months ago Uncontrolled type 2 diabetes mellitus with hyperglycemia ([address]    [address], [address], [address], [place], [place]    Office Visit    1 year ago [address], [place] Elly Olszewski, DO    Office Visit                        Glucose Blood (ACCU-CHEK ALY PLUS) In Vitro Strip 100 strip 3     Sig: TEST ONCE DAILY       There is no refill protocol information for this order          Recent Outpatient Visits              5 days ago Controlled type 2 diabetes mellitus without complication, without long-term current use of insulin ([address]    [address], [address], Addison Elly Olszewski, DO    Office Visit    3 months ago Controlled type 2 diabetes mellitus without complication, without long-term current use of insulin ([address]    [address], [address], [address], [place], [place]    Office Visit    7 months ago Adult general medical exam    [address] [place] [address], [address], [place], [place]    Office Visit    11 months ago Uncontrolled type 2 diabetes mellitus with hyperglycemia ([address]    [address], [address], [address], [place], [place]    Office Visit    1 year ago [address], [address], [place], [place]    Office Visit

## 2023-11-20 DIAGNOSIS — E11.9 CONTROLLED TYPE 2 DIABETES MELLITUS WITHOUT COMPLICATION, WITHOUT LONG-TERM CURRENT USE OF INSULIN (HCC): ICD-10-CM

## 2023-11-21 RX ORDER — PIOGLITAZONEHYDROCHLORIDE 30 MG/1
30 TABLET ORAL DAILY
Qty: 90 TABLET | Refills: 3 | Status: SHIPPED | OUTPATIENT
Start: 2023-11-21

## 2023-11-21 NOTE — TELEPHONE ENCOUNTER
Refill passed per [facility] protocol. Requested Prescriptions   Pending Prescriptions Disp Refills    PIOGLITAZONE 30 MG Oral Tab [Pharmacy Med Name: Pioglitazone Hydrochloride 30 Mg Tab Nort] 90 tablet 0     Sig: Take 1 tablet (30 mg total) by mouth daily. For diabetes.        Diabetes Medication Protocol Passed - 11/20/2023  1:30 AM        Passed - Last A1C < 7.5 and within past 6 months     Lab Results   Component Value Date    A1C 6.8 (H) 11/11/2023             Passed - In person appointment or virtual visit in the past 6 mos or appointment in next 3 mos     Recent Outpatient Visits              1 week ago Controlled type 2 diabetes mellitus without complication, without long-term current use of insulin ([address]    [address], [address], [place]ak, DO    Office Visit    4 months ago Controlled type 2 diabetes mellitus without complication, without long-term current use of insulin ([address]    [address], [address], [address], [place], [place]    Office Visit    7 months ago Adult general medical exam    [address] [facility] [address], [place], [place]    Office Visit    11 months ago Uncontrolled type 2 diabetes mellitus with hyperglycemia ([address]    [address], [address], [place], DO    Office Visit    1 year ago [address] [facility], [address], [place], [place]    Office Visit                      Passed - EGFRCR or GFRNAA > 50     GFR Evaluation  EGFRCR: 92 , resulted on 11/11/2023          Passed - GFR in the past 12 months             Recent Outpatient Visits              1 week ago Controlled type 2 diabetes mellitus without complication, without long-term current use of insulin ([address]    [address], [address], [place] Nowak, DO    Office Visit    4 months ago Controlled type 2 diabetes mellitus without complication, without long-term current use of insulin ([facility]    [address], [place], [place]    Office Visit    7 months ago Adult general medical exam    [address], [place], DO    Office Visit    11 months ago Uncontrolled type 2 diabetes mellitus with hyperglycemia ([facility]    [address], [address], [place],     Office Visit    1 year ago [address], [address], [place], [place]    Office Visit

## 2024-03-17 DIAGNOSIS — I10 ESSENTIAL HYPERTENSION: ICD-10-CM

## 2024-03-19 RX ORDER — LISINOPRIL 5 MG/1
5 TABLET ORAL DAILY
Qty: 90 TABLET | Refills: 3 | Status: SHIPPED | OUTPATIENT
Start: 2024-03-19

## 2024-03-19 NOTE — TELEPHONE ENCOUNTER
Refill passed per [facility] protocol.  Requested Prescriptions   Pending Prescriptions Disp Refills    LISINOPRIL 5 MG Oral Tab [Pharmacy Med Name: Lisinopril 5 Mg Tab Solc] 90 tablet 0     Sig: Take 1 tablet (5 mg total) by mouth daily.       Hypertension Medications Protocol Passed - 3/17/2024  1:30 AM        Passed - CMP or BMP in past 12 months        Passed - Last BP reading less than 140/90     BP Readings from Last 1 Encounters:   11/11/23 103/63               Passed - In person appointment or virtual visit in the past 12 mos or appointment in next 3 mos     Recent Outpatient Visits              4 months ago Controlled type 2 diabetes mellitus without complication, without long-term current use of insulin ([facility])    [facility], RalphJorge Cain,     Office Visit    8 months ago Controlled type 2 diabetes mellitus without complication, without long-term current use of insulin ([facility])    [facility], [place],     Office Visit    11 months ago Adult general medical exam    [facility], [place],     Office Visit    1 year ago Uncontrolled type 2 diabetes mellitus with hyperglycemia ([facility])    [facility], [place] Cain,     Office Visit    1 year ago [facility]     [facility]          Future Appointments         Provider Department Appt Notes    In 1 week Jorge Ritter DO [facility] follow up/diabetes               Passed - EGFRCR or GFRNAA > 50     GFR Evaluation  EGFRCR: 92 , resulted on 11/11/2023             Future Appointments         Provider Department Appt Notes    In 1 week Jorge Ritter DO [facility] follow up/diabetes          Recent Outpatient Visits              4 months  ago Controlled type 2 diabetes mellitus without complication, without long-term current use of insulin ([facility])    [facility], Jorge Tucker, DO    Office Visit    8 months ago Controlled type 2 diabetes mellitus without complication, without long-term current use of insulin ([facility])    [facility], [address], Jorge Tucker,     Office Visit    11 months ago Adult general medical exam    [facility], [address], Jorge Tucker,     Office Visit    1 year ago Uncontrolled type 2 diabetes mellitus with hyperglycemia ([facility])    [facility], [address], Jorge Tucker,     Office Visit    1 year ago Dysuria    [facility], [address], Jorge Tucker,     Office Visit

## 2024-03-28 ENCOUNTER — LAB ENCOUNTER (OUTPATIENT)
Dept: LAB | Age: 54
End: 2024-03-28
Attending: FAMILY MEDICINE
Payer: COMMERCIAL

## 2024-03-28 ENCOUNTER — OFFICE VISIT (OUTPATIENT)
Dept: FAMILY MEDICINE CLINIC | Facility: CLINIC | Age: 54
End: 2024-03-28

## 2024-03-28 ENCOUNTER — TELEPHONE (OUTPATIENT)
Dept: FAMILY MEDICINE CLINIC | Facility: CLINIC | Age: 54
End: 2024-03-28

## 2024-03-28 VITALS
TEMPERATURE: 97 F | HEIGHT: 63 IN | SYSTOLIC BLOOD PRESSURE: 99 MMHG | BODY MASS INDEX: 28.38 KG/M2 | HEART RATE: 74 BPM | DIASTOLIC BLOOD PRESSURE: 64 MMHG | WEIGHT: 160.19 LBS

## 2024-03-28 DIAGNOSIS — E03.9 ACQUIRED HYPOTHYROIDISM: ICD-10-CM

## 2024-03-28 DIAGNOSIS — E11.9 CONTROLLED TYPE 2 DIABETES MELLITUS WITHOUT COMPLICATION, WITHOUT LONG-TERM CURRENT USE OF INSULIN (HCC): ICD-10-CM

## 2024-03-28 DIAGNOSIS — E11.9 CONTROLLED TYPE 2 DIABETES MELLITUS WITHOUT COMPLICATION, WITHOUT LONG-TERM CURRENT USE OF INSULIN (HCC): Primary | ICD-10-CM

## 2024-03-28 DIAGNOSIS — Z00.00 ADULT GENERAL MEDICAL EXAM: Primary | ICD-10-CM

## 2024-03-28 DIAGNOSIS — Z12.31 VISIT FOR SCREENING MAMMOGRAM: ICD-10-CM

## 2024-03-28 DIAGNOSIS — E78.2 MIXED HYPERLIPIDEMIA: ICD-10-CM

## 2024-03-28 DIAGNOSIS — Z00.00 ADULT GENERAL MEDICAL EXAM: ICD-10-CM

## 2024-03-28 DIAGNOSIS — E01.0 THYROMEGALY: ICD-10-CM

## 2024-03-28 DIAGNOSIS — J30.89 OTHER ALLERGIC RHINITIS: ICD-10-CM

## 2024-03-28 DIAGNOSIS — I10 ESSENTIAL HYPERTENSION: ICD-10-CM

## 2024-03-28 DIAGNOSIS — E11.9 TYPE 2 DIABETES MELLITUS WITHOUT COMPLICATION, WITHOUT LONG-TERM CURRENT USE OF INSULIN (HCC): ICD-10-CM

## 2024-03-28 LAB
ALBUMIN SERPL-MCNC: 4.7 G/DL (ref 3.2–4.8)
ALBUMIN/GLOB SERPL: 1.8 {RATIO} (ref 1–2)
ALP LIVER SERPL-CCNC: 64 U/L
ALT SERPL-CCNC: 18 U/L
ANION GAP SERPL CALC-SCNC: 6 MMOL/L (ref 0–18)
AST SERPL-CCNC: 18 U/L (ref ?–34)
BASOPHILS # BLD AUTO: 0.03 X10(3) UL (ref 0–0.2)
BASOPHILS NFR BLD AUTO: 0.4 %
BILIRUB SERPL-MCNC: 0.9 MG/DL (ref 0.3–1.2)
BUN BLD-MCNC: 10 MG/DL (ref 9–23)
BUN/CREAT SERPL: 12.7 (ref 10–20)
CALCIUM BLD-MCNC: 9.7 MG/DL (ref 8.7–10.4)
CHLORIDE SERPL-SCNC: 104 MMOL/L (ref 98–112)
CHOLEST SERPL-MCNC: 179 MG/DL (ref ?–200)
CO2 SERPL-SCNC: 28 MMOL/L (ref 21–32)
CREAT BLD-MCNC: 0.79 MG/DL
CREAT UR-SCNC: 32.1 MG/DL
DEPRECATED RDW RBC AUTO: 43.8 FL (ref 35.1–46.3)
EGFRCR SERPLBLD CKD-EPI 2021: 90 ML/MIN/1.73M2 (ref 60–?)
EOSINOPHIL # BLD AUTO: 0.05 X10(3) UL (ref 0–0.7)
EOSINOPHIL NFR BLD AUTO: 0.7 %
ERYTHROCYTE [DISTWIDTH] IN BLOOD BY AUTOMATED COUNT: 13.3 % (ref 11–15)
EST. AVERAGE GLUCOSE BLD GHB EST-MCNC: 146 MG/DL (ref 68–126)
FASTING PATIENT LIPID ANSWER: YES
FASTING STATUS PATIENT QL REPORTED: YES
GLOBULIN PLAS-MCNC: 2.6 G/DL (ref 2.8–4.4)
GLUCOSE BLD-MCNC: 86 MG/DL (ref 70–99)
HBA1C MFR BLD: 6.7 % (ref ?–5.7)
HCT VFR BLD AUTO: 38 %
HDLC SERPL-MCNC: 41 MG/DL (ref 40–59)
HGB BLD-MCNC: 12.6 G/DL
IMM GRANULOCYTES # BLD AUTO: 0.02 X10(3) UL (ref 0–1)
IMM GRANULOCYTES NFR BLD: 0.3 %
LDLC SERPL CALC-MCNC: 104 MG/DL (ref ?–100)
LYMPHOCYTES # BLD AUTO: 2.11 X10(3) UL (ref 1–4)
LYMPHOCYTES NFR BLD AUTO: 31 %
MCH RBC QN AUTO: 29.9 PG (ref 26–34)
MCHC RBC AUTO-ENTMCNC: 33.2 G/DL (ref 31–37)
MCV RBC AUTO: 90 FL
MICROALBUMIN UR-MCNC: <0.3 MG/DL
MONOCYTES # BLD AUTO: 0.39 X10(3) UL (ref 0.1–1)
MONOCYTES NFR BLD AUTO: 5.7 %
NEUTROPHILS # BLD AUTO: 4.21 X10 (3) UL (ref 1.5–7.7)
NEUTROPHILS # BLD AUTO: 4.21 X10(3) UL (ref 1.5–7.7)
NEUTROPHILS NFR BLD AUTO: 61.9 %
NONHDLC SERPL-MCNC: 138 MG/DL (ref ?–130)
OSMOLALITY SERPL CALC.SUM OF ELEC: 284 MOSM/KG (ref 275–295)
PLATELET # BLD AUTO: 287 10(3)UL (ref 150–450)
POTASSIUM SERPL-SCNC: 4.3 MMOL/L (ref 3.5–5.1)
PROT SERPL-MCNC: 7.3 G/DL (ref 5.7–8.2)
RBC # BLD AUTO: 4.22 X10(6)UL
SODIUM SERPL-SCNC: 138 MMOL/L (ref 136–145)
TRIGL SERPL-MCNC: 198 MG/DL (ref 30–149)
TSI SER-ACNC: 1.2 MIU/ML (ref 0.55–4.78)
VLDLC SERPL CALC-MCNC: 33 MG/DL (ref 0–30)
WBC # BLD AUTO: 6.8 X10(3) UL (ref 4–11)

## 2024-03-28 PROCEDURE — 82043 UR ALBUMIN QUANTITATIVE: CPT

## 2024-03-28 PROCEDURE — 3074F SYST BP LT 130 MM HG: CPT | Performed by: FAMILY MEDICINE

## 2024-03-28 PROCEDURE — 3078F DIAST BP <80 MM HG: CPT | Performed by: FAMILY MEDICINE

## 2024-03-28 PROCEDURE — 83036 HEMOGLOBIN GLYCOSYLATED A1C: CPT

## 2024-03-28 PROCEDURE — 85025 COMPLETE CBC W/AUTO DIFF WBC: CPT

## 2024-03-28 PROCEDURE — 80053 COMPREHEN METABOLIC PANEL: CPT

## 2024-03-28 PROCEDURE — 82570 ASSAY OF URINE CREATININE: CPT

## 2024-03-28 PROCEDURE — 3008F BODY MASS INDEX DOCD: CPT | Performed by: FAMILY MEDICINE

## 2024-03-28 PROCEDURE — 99212 OFFICE O/P EST SF 10 MIN: CPT | Performed by: FAMILY MEDICINE

## 2024-03-28 PROCEDURE — 36415 COLL VENOUS BLD VENIPUNCTURE: CPT

## 2024-03-28 PROCEDURE — 3072F LOW RISK FOR RETINOPATHY: CPT | Performed by: FAMILY MEDICINE

## 2024-03-28 PROCEDURE — 84443 ASSAY THYROID STIM HORMONE: CPT

## 2024-03-28 PROCEDURE — 99396 PREV VISIT EST AGE 40-64: CPT | Performed by: FAMILY MEDICINE

## 2024-03-28 PROCEDURE — 80061 LIPID PANEL: CPT

## 2024-03-28 RX ORDER — MOMETASONE FUROATE 50 UG/1
2 SPRAY, METERED NASAL DAILY
Qty: 3 EACH | Refills: 3 | Status: SHIPPED | OUTPATIENT
Start: 2024-03-28

## 2024-03-28 RX ORDER — CANAGLIFLOZIN AND METFORMIN HYDROCHLORIDE 150; 1000 MG/1; MG/1
2 TABLET, FILM COATED, EXTENDED RELEASE ORAL DAILY
Qty: 180 TABLET | Refills: 3 | Status: SHIPPED | OUTPATIENT
Start: 2024-03-28 | End: 2024-03-30

## 2024-03-28 RX ORDER — GLIMEPIRIDE 4 MG/1
4 TABLET ORAL 2 TIMES DAILY
Qty: 180 TABLET | Refills: 3 | Status: SHIPPED | OUTPATIENT
Start: 2024-03-28

## 2024-03-28 RX ORDER — ROSUVASTATIN CALCIUM 10 MG/1
10 TABLET, COATED ORAL NIGHTLY
Qty: 90 TABLET | Refills: 1 | Status: SHIPPED | OUTPATIENT
Start: 2024-03-28

## 2024-03-28 RX ORDER — LISINOPRIL 5 MG/1
5 TABLET ORAL DAILY
Qty: 90 TABLET | Refills: 3 | Status: SHIPPED | OUTPATIENT
Start: 2024-03-28

## 2024-03-28 RX ORDER — PIOGLITAZONEHYDROCHLORIDE 30 MG/1
30 TABLET ORAL DAILY
Qty: 90 TABLET | Refills: 3 | Status: SHIPPED | OUTPATIENT
Start: 2024-03-28

## 2024-03-28 RX ORDER — LEVOTHYROXINE SODIUM 0.05 MG/1
50 TABLET ORAL
Qty: 90 TABLET | Refills: 3 | Status: CANCELLED | OUTPATIENT
Start: 2024-03-28

## 2024-03-28 NOTE — PROGRESS NOTES
Patient ID: Brenda Herrera is a 54 year old female.    HPI  Chief Complaint   Patient presents with    Diabetes     Follow up     Medication Request     Nasal spray      Last seen by me on 11/11/2023.  Her physical exam is due in a few days so we made today's visit a physical instead.  She states that the nasal spray really helps her allergies and now that it spring she would need a refill.  She gets some nasal congestion and runny nose.  No fevers or cough.    Last A1c value was 6.8% done 11/11/2023. Patient denies any chest pain, shortness breath, diaphoresis, dizziness, hypoglycemia, numbness or tingling in the legs. I reviewed her other lab results which showed elevated cholesterol. She had a no diabetic retinopathy exam on 12/12/2022; states she completed another exam in 2/23/2024 with Dr. Degroot, ophthalmologist. Pt is compliant with her other medications. She completed a mammogram on 9/30/2023.     Pt requests a nasal spray prescription for her seasonal allergies. She experiences frontal headaches which resolves with the nasal spray. I provided the medication for her.      Health Maintenance   Topic Date Due    COVID-19 Vaccine (4 - 2023-24 season) 09/01/2023    Diabetes Care Dilated Eye Exam  12/12/2023    Annual Depression Screening  01/01/2024    Annual Physical  03/31/2024    Diabetes Care A1C  05/11/2024    Diabetes Care Foot Exam  07/22/2024    Diabetes Care: Microalb/Creat Ratio  07/22/2024    Mammogram  09/30/2024    Diabetes Care: GFR  11/11/2024    DTaP,Tdap,and Td Vaccines (2 - Td or Tdap) 11/14/2025    Colorectal Cancer Screening  02/18/2029    Pneumococcal Vaccine: Birth to 64yrs (3 of 3 - PPSV23 or PCV20) 03/06/2037    Influenza Vaccine  Completed    Zoster Vaccines  Completed       =======================================================    Lab Results   Component Value Date    WBC 7.8 11/11/2023    RBC 4.19 11/11/2023    HGB 12.6 11/11/2023    HCT 37.1 11/11/2023    .0 11/11/2023     MPV 8.2 03/31/2018    MCV 88.5 11/11/2023    MCH 30.1 11/11/2023    MCHC 34.0 11/11/2023    RDW 13.2 11/11/2023    NEPRELIM 4.32 11/11/2023    NEPERCENT 55.2 11/11/2023    LYPERCENT 36.8 11/11/2023    MOPERCENT 6.5 11/11/2023    EOPERCENT 0.8 11/11/2023    BAPERCENT 0.4 11/11/2023    NE 4.32 11/11/2023    LYMABS 2.88 11/11/2023    MOABSO 0.51 11/11/2023    EOABSO 0.06 11/11/2023    BAABSO 0.03 11/11/2023       Lab Results   Component Value Date    GLU 70 11/11/2023    BUN 16 11/11/2023    BUNCREA 20.5 (H) 11/11/2023    CREATSERUM 0.78 11/11/2023    ANIONGAP 11 11/11/2023    GFRNAA 92 05/20/2022    GFRAA 106 05/20/2022    CA 9.8 11/11/2023    OSMOCALC 292 11/11/2023    ALKPHO 55 07/22/2023    AST 19 07/22/2023    ALT 32 07/22/2023    ALKPHOS 58 01/16/2016    BILT 0.7 07/22/2023    TP 6.9 07/22/2023    ALB 3.8 07/22/2023    GLOBULIN 3.1 07/22/2023    AGRATIO 1.5 01/16/2016     11/11/2023    K 4.0 11/11/2023     11/11/2023    CO2 25.0 11/11/2023       Lab Results   Component Value Date    GLU 70 11/11/2023    BUN 16 11/11/2023    CREATSERUM 0.78 11/11/2023    BUNCREA 20.5 (H) 11/11/2023    ANIONGAP 11 11/11/2023    GFRAA 106 05/20/2022    GFRNAA 92 05/20/2022    CA 9.8 11/11/2023     11/11/2023    K 4.0 11/11/2023     11/11/2023    CO2 25.0 11/11/2023    OSMOCALC 292 11/11/2023       Lab Results   Component Value Date    COLORUR Yellow 10/31/2022    CLARITY Clear 10/31/2022    SPECGRAVITY 1.020 10/31/2022    GLUUR >=500 (A) 10/31/2022    BILUR Negative 10/31/2022    KETUR Negative 10/31/2022    BLOODURINE Negative 10/31/2022    PHURINE 6.0 10/31/2022    PROUR Negative 10/31/2022    UROBILINOGEN <2.0 10/31/2022    NITRITE Negative 10/31/2022    LEUUR Negative 10/31/2022    NMIC Microscopic not indicated 10/31/2022     Lab Results   Component Value Date     (H) 11/11/2023    A1C 6.8 (H) 11/11/2023    A1C 6.8 (H) 07/22/2023    A1C 6.7 (H) 03/22/2023       Lab Results   Component Value  Date    MALBP <0.50 07/22/2023    CREUR 29.80 07/22/2023       Lab Results   Component Value Date    CHOLEST 176 07/22/2023    TRIG 253 (H) 07/22/2023    HDL 43 07/22/2023    LDL 91 07/22/2023    VLDL 41 (H) 07/22/2023    NONHDLC 133 (H) 07/22/2023    CALCNONHDL 141 (H) 01/16/2016    CALCNONHDL 140 (H) 02/07/2015    CALCNONHDL 153 (H) 10/19/2013     Free T4 (ng/dL)   Date Value   08/26/2022 0.8     TSH (mIU/mL)   Date Value   07/22/2023 1.130     TSH (S) (uIU/mL)   Date Value   02/07/2015 2.83       =======================================================    Wt Readings from Last 6 Encounters:   03/28/24 160 lb 3.2 oz   11/11/23 155 lb   07/22/23 159 lb   03/31/23 154 lb   12/19/22 151 lb   10/31/22 148 lb               BMI Readings from Last 6 Encounters:   03/28/24 28.38 kg/m²   11/11/23 27.46 kg/m²   07/22/23 28.17 kg/m²   03/31/23 27.28 kg/m²   12/19/22 26.75 kg/m²   10/31/22 26.22 kg/m²       BP Readings from Last 6 Encounters:   03/28/24 99/64   11/11/23 103/63   07/22/23 102/70   03/31/23 97/65   12/19/22 106/71   10/31/22 115/74         Review of Systems   Constitutional:  Negative for diaphoresis.   Respiratory:  Negative for shortness of breath.    Cardiovascular:  Negative for chest pain.   Allergic/Immunologic: Positive for environmental allergies.   Neurological:  Negative for dizziness and numbness.         Past Medical History:   Diagnosis Date    Diabetes (HCC)     High blood pressure     High cholesterol     Lipid screening 10/19/2013    per NG    Type 2 diabetes mellitus without complication, without long-term current use of insulin (HCC) 6/29/2017    Uterine fibroid 2016    lupron injections.       Past Surgical History:   Procedure Laterality Date    COLONOSCOPY N/A 2/18/2022    Procedure: COLONOSCOPY;  Surgeon: Charlie Stewart MD;  Location: [facility]    HYSTERECTOMY         Social History     Socioeconomic History    Marital status:      Spouse name: Not on file    Number of children:  Not on file    Years of education: Not on file    Highest education level: Not on file   Occupational History    Not on file   Tobacco Use    Smoking status: Never    Smokeless tobacco: Never   Vaping Use    Vaping Use: Not on file   Substance and Sexual Activity    Alcohol use: No    Drug use: No    Sexual activity: Not on file   Other Topics Concern     Service Not Asked    Blood Transfusions Not Asked    Caffeine Concern Yes     Comment: coffee, rarely    Occupational Exposure Not Asked    Hobby Hazards Not Asked    Sleep Concern Not Asked    Stress Concern Not Asked    Weight Concern Not Asked    Special Diet Not Asked    Back Care Not Asked    Exercise Not Asked    Bike Helmet Not Asked    Seat Belt Not Asked    Self-Exams Not Asked   Social History Narrative    Not on file     Social Determinants of Health     Financial Resource Strain: Not on file   Food Insecurity: Not on file   Transportation Needs: Not on file   Physical Activity: Not on file   Stress: Not on file   Social Connections: Not on file   Housing Stability: Not on file          Current Outpatient Medications   Medication Sig Dispense Refill    lisinopril 5 MG Oral Tab Take 1 tablet (5 mg total) by mouth daily. 90 tablet 3    pioglitazone 30 MG Oral Tab Take 1 tablet (30 mg total) by mouth daily. For diabetes. 90 tablet 3    Accu-Chek Softclix Lancets Does not apply Misc TEST ONCE DAILY 100 each 3    Glucose Blood (ACCU-CHEK ALY PLUS) In Vitro Strip TEST ONCE DAILY 100 strip 3    Blood Glucose Monitoring Suppl Does not apply Kit Check blood sugar once daily. 1 kit 0    Lancets Does not apply Misc Check blood sugar daily. 100 each 3    Glucose Blood (ONETOUCH VERIO) In Vitro Strip Check blood sugar daily. 100 strip 3    rosuvastatin 10 MG Oral Tab Take 1 tablet (10 mg total) by mouth nightly. 90 tablet 1    mometasone furoate 50 MCG/ACT Nasal Suspension 2 sprays by Nasal route daily. 2 sprays each nostril once in the morning.  Squeeze  nose afterwards and do not snort it down throat. 3 each 1    glimepiride 4 MG Oral Tab Take 1 tablet (4 mg total) by mouth in the morning and 1 tablet (4 mg total) before bedtime. For diabetes. 180 tablet 3    levothyroxine 50 MCG Oral Tab Take 1 tablet (50 mcg total) by mouth before breakfast. 90 tablet 3    INVOKAMET -1000 MG Oral Tablet 24 Hr Take 2 tablets by mouth daily. For diabetes. 180 tablet 3    montelukast 10 MG Oral Tab Take 1 tablet (10 mg total) by mouth nightly. 90 tablet 3    ASPIRIN LOW DOSE 81 MG Oral Tab EC TAKE ONE TABLET BY MOUTH ONE TIME DAILY 90 tablet 3    Blood Gluc Meter Disp-Strips Does not apply Device Needs 1 glucometer along with 100 test strips and 100 lancets with 11 refills each. Check sugars BID and prn. 1 each 0    Multiple Vitamins-Minerals (WOMENS MULTIVITAMIN PLUS) Oral Tab Take 1 tablet by mouth daily.       Allergies:No Known Allergies   PHYSICAL EXAM:   Physical Exam      Physical Exam   Constitutional: . She appears well-developed and well-nourished. No distress.   Head: Normocephalic.   Right Ear: Tympanic membrane and ear canal normal.   Left Ear: Tympanic membrane and ear canal normal.   Nose: No rhinorrhea. Pale, boggy nasal mucosa.   Mouth/Throat: Oropharynx is clear and moist and mucous membranes are normal.   Eyes: Conjunctivae and EOM are normal. Pupils are equal, round, and reactive to light.   Neck: Normal range of motion. Minimally swollen thyroid without discrete nodule.  No issues with swallowing.  Cardiovascular: Normal rate, regular rhythm and no murmur heard. Good pulse.   Pulmonary/Chest: Effort normal and breath sounds normal. No respiratory distress.   Abdominal: Soft. Bowel sounds are normal. There is no hepatosplenomegaly. There is no tenderness.   Lymphadenopathy: She has no cervical adenopathy.   Neurological: She is alert and oriented to person, place, and time. She has normal reflexes. No cranial nerve deficit.   Skin: Skin is warm and dry. No  rash noted.   Psychiatric: She has a normal mood and affect.  Lower legs: No edema of the legs bilaterally.  Bilateral barefoot skin diabetic exam is normal, visualized feet and the appearance is normal.  Bilateral monofilament/sensation of both feet is normal.  Pulsation pedal pulse exam of both lower legs/feet is normal as well.      Vitals reviewed.    Blood pressure 99/64, pulse 74, temperature 97 °F (36.1 °C), height 5' 3\" (1.6 m), weight 160 lb 3.2 oz, not currently breastfeeding.         ASSESSMENT/PLAN:     Diagnoses and all orders for this visit:    Adult general medical exam  -     CBC With Differential With Platelet; Future  -     Comp Metabolic Panel (14); Future  -     Lipid Panel; Future    Controlled type 2 diabetes mellitus without complication, without long-term current use of insulin (HCC)  -     pioglitazone 30 MG Oral Tab; Take 1 tablet (30 mg total) by mouth daily. For diabetes.  -     INVOKAMET -1000 MG Oral Tablet 24 Hr; Take 2 tablets by mouth daily. For diabetes.  -     glimepiride 4 MG Oral Tab; Take 1 tablet (4 mg total) by mouth in the morning and 1 tablet (4 mg total) before bedtime. For diabetes.  -     Microalb/Creat Ratio, Random Urine; Future  -     Hemoglobin A1C; Future    Mixed hyperlipidemia  -     rosuvastatin 10 MG Oral Tab; Take 1 tablet (10 mg total) by mouth nightly.    Essential hypertension  -     lisinopril 5 MG Oral Tab; Take 1 tablet (5 mg total) by mouth daily.    Acquired hypothyroidism  -     TSH W Reflex To Free T4; Future  -     US THYROID (CPT=76536); Future  I would like to do an ultrasound of the thyroid.  She has never had one before.  Other allergic rhinitis  -     mometasone furoate 50 MCG/ACT Nasal Suspension; 2 sprays by Nasal route daily. 2 sprays each nostril once in the morning.  Squeeze nose afterwards and do not snort it down throat.    Thyromegaly  -     US THYROID (CPT=76536); Future  As above  Visit for screening mammogram  -     HENRY PARMAR  2D+3D SCREENING BILAT (CPT=77067/57899); Future  Due after September 30 of this year.      Referrals (if applicable)  No orders of the defined types were placed in this encounter.      Follow up if symptoms persist.  Take medicine (if given) as prescribed.  Approach to treatment discussed and patient/family member understands and agrees to plan.     No follow-ups on file.    There are no Patient Instructions on file for this visit.    Josh Weir    3/28/2024    By signing my name below, IJosh,  attest that this documentation has been prepared under the direction and in the presence of Jorge Ritter DO.   Electronically Signed: Josh Weir, 3/28/2024, 8:57 AM.    I, Jorge Ritter DO,  personally performed the services described in this documentation. All medical record entries made by the scribe were at my direction and in my presence.  I have reviewed the chart and discharge instructions (if applicable) and agree that the record reflects my personal performance and is accurate and complete.  Jorge Ritter DO, 3/28/2024, 9:10 AM

## 2024-03-28 NOTE — TELEPHONE ENCOUNTER
Per pharmacy PA is needed for the following medication.         INVOKAMET -1000 MG Oral Tablet 24 Hr, Take 2 tablets by mouth daily. For diabetes., Disp: 180 tablet, Rfl: 3    Key: [id number]  Patient last name: Javier  : 1970

## 2024-03-29 NOTE — TELEPHONE ENCOUNTER
Prior authorization for invokamet has been denied. Please refer to denial letter that was faxed to the office.     Denied    Request Reference Number: [id number]. INVOKAMET XR -1000 is denied due to Plan Exclusion. For further questions, call [phone].   Case ID: [id number]      Payer: [employer]   Electronic appeal: Not supported   Appeals are not supported through ePA. Please refer to the fax case notice for appeals information and instructions.   View History

## 2024-03-30 RX ORDER — DAPAGLIFLOZIN AND METFORMIN HYDROCHLORIDE 5; 1000 MG/1; MG/1
TABLET, FILM COATED, EXTENDED RELEASE ORAL DAILY
Qty: 180 TABLET | Refills: 1 | Status: SHIPPED | OUTPATIENT
Start: 2024-03-30 | End: 2024-04-29

## 2024-03-30 NOTE — TELEPHONE ENCOUNTER
It looks like Invokamet XR is not covered under insurance.  I sent in Xigduo XR instead.  She should start this after she is done with her Invokamet.  She can take 2 tablets at the same time .

## 2024-04-01 NOTE — TELEPHONE ENCOUNTER
Approved    Prior authorization approved   Case ID: [id number]      Payer: [facility]    [phone]    [phone]   Your PA request has been approved.  Additional information will be provided in the approval communication. (Message 1149)   Approval Details    Authorized from April 1, 2024 to July 1, 2024      Electronic appeal: Not supported   View History

## 2024-04-13 RX ORDER — ROSUVASTATIN CALCIUM 20 MG/1
20 TABLET, COATED ORAL NIGHTLY
Qty: 90 TABLET | Refills: 1 | Status: SHIPPED | OUTPATIENT
Start: 2024-04-13

## 2024-04-20 ENCOUNTER — HOSPITAL ENCOUNTER (OUTPATIENT)
Dept: ULTRASOUND IMAGING | Age: 54
Discharge: HOME | End: 2024-04-20
Attending: FAMILY MEDICINE
Payer: COMMERCIAL

## 2024-04-20 DIAGNOSIS — E01.0 THYROMEGALY: ICD-10-CM

## 2024-04-20 DIAGNOSIS — E03.9 ACQUIRED HYPOTHYROIDISM: ICD-10-CM

## 2024-04-20 PROCEDURE — 76536 US EXAM OF HEAD AND NECK: CPT | Performed by: FAMILY MEDICINE

## 2024-05-01 ENCOUNTER — TELEPHONE (OUTPATIENT)
Dept: FAMILY MEDICINE CLINIC | Facility: CLINIC | Age: 54
End: 2024-05-01

## 2024-05-01 NOTE — TELEPHONE ENCOUNTER
Spoke with BOBBY Ayala verified  She wants to clarify medications for invokamet vs Xigduo, she wants to know what meds pt should be taking?   Per Dr Ritter notes on 3-30-24, pt insurance will no longer cover Invokamet but they will cover Xigduo.    She stated understanding.   Last ref Xidgduo on 3-30-24, she stated this Rx probably expensive for pt, she will call back with any question. .   See telephone encounter 3-28-24.

## 2024-05-02 RX ORDER — ASPIRIN 81 MG/1
81 TABLET ORAL DAILY
Qty: 90 TABLET | Refills: 1 | Status: SHIPPED | OUTPATIENT
Start: 2024-05-02

## 2024-05-02 NOTE — TELEPHONE ENCOUNTER
Refill passed per [facility] protocol.   Requested Prescriptions   Pending Prescriptions Disp Refills    ASPIRIN LOW DOSE 81 MG Oral Tab EC [Pharmacy Med Name: Aspirin Low Dose Ec 81 Mg Tab Gloria] 90 tablet 0     Sig: TAKE ONE TABLET BY MOUTH ONCE DAILY       Aspirin Protocol Passed - 5/1/2024  1:30 AM        Passed - In person appointment or virtual visit in the past 6 mos or appointment in next 3 mos     Recent Outpatient Visits              1 month ago Adult general medical exam    [facility], [address], Jorge Tucker, DO    Office Visit    5 months ago Controlled type 2 diabetes mellitus without complication, without long-term current use of insulin ([facility])    [facility], [facility] Jorge Tucker, DO    Office Visit    9 months ago Controlled type 2 diabetes mellitus without complication, without long-term current use of insulin ([facility])    [facility], [address], Jorge Tucker, DO    Office Visit    1 year ago Adult general medical exam    [facility] Jorge Tucker, DO    Office Visit    1 year ago Uncontrolled type 2 diabetes mellitus with hyperglycemia ([facility])    [facility], [address], Jorge Tucker, DO    Office Visit                           Recent Outpatient Visits              1 month ago Adult general medical exam    [facility], [address], Jorge Tucker, DO    Office Visit    5 months ago Controlled type 2 diabetes mellitus without complication, without long-term current use of insulin (HCC)    [facility] Jorge Tucker, DO    Office Visit    9 months ago Controlled type 2 diabetes mellitus without complication, without long-term current use of insulin ([facility])    [facility], Jorge Tucker, DO    Office Visit    1 year ago Adult general  medical exam    [facility], [address], Jorge Tucker,     Office Visit    1 year ago Uncontrolled type 2 diabetes mellitus with hyperglycemia (HCC)    [facility], [address], Jorge Tucker, DO    Office Visit

## 2024-06-17 ENCOUNTER — TELEPHONE (OUTPATIENT)
Dept: FAMILY MEDICINE CLINIC | Facility: CLINIC | Age: 54
End: 2024-06-17

## 2024-06-17 DIAGNOSIS — N63.0 MASS OF BREAST, UNSPECIFIED LATERALITY: Primary | ICD-10-CM

## 2024-06-17 NOTE — TELEPHONE ENCOUNTER
Pt came to office concerned with new lumps/pain around breast area. Pt tried to schedule mammogram with order in system but not able to schedule because it's not due until end of September. Please advise.

## 2024-06-18 NOTE — TELEPHONE ENCOUNTER
Brayden patient spouse on [employer] was called and inform of  message below and he verbalized understanding. He will inform patient. If appointment is to far out he will call us back.    Referred to Provider Information:  Provider Address Phone   Lexi Kearns MD [address] [place] [id number] [phone]

## 2024-06-18 NOTE — TELEPHONE ENCOUNTER
I am going to have her see one of our breast specialists and we will do a referral to Dr. Lexi Kearns.

## 2024-06-25 ENCOUNTER — TELEPHONE (OUTPATIENT)
Dept: FAMILY MEDICINE CLINIC | Facility: CLINIC | Age: 54
End: 2024-06-25

## 2024-06-25 DIAGNOSIS — N64.4 BREAST PAIN, RIGHT: Primary | ICD-10-CM

## 2024-06-25 NOTE — TELEPHONE ENCOUNTER
Pts  came into office stating wife has increased pain near breast area. Pt is requesting new diagnostic mammo order as central scheduling is booked till September. Please advise

## 2024-06-25 NOTE — TELEPHONE ENCOUNTER
Contacted patient and informed order has been placed. Patient requested me to call her spouse and provide phone number to central scheduling.     I contacted Brayden () and provided phone number.

## 2024-07-04 DIAGNOSIS — J30.89 OTHER ALLERGIC RHINITIS: ICD-10-CM

## 2024-07-08 RX ORDER — MONTELUKAST SODIUM 10 MG/1
10 TABLET ORAL NIGHTLY
Qty: 90 TABLET | Refills: 3 | Status: SHIPPED | OUTPATIENT
Start: 2024-07-08

## 2024-07-08 NOTE — TELEPHONE ENCOUNTER
Please review. Protocol Failed; No Protocol    Requested Prescriptions   Pending Prescriptions Disp Refills    MONTELUKAST 10 MG Oral Tab [Pharmacy Med Name: Montelukast Sodium 10 Mg Tab Auro] 90 tablet 0     Sig: Take 1 tablet (10 mg total) by mouth nightly.       Asthma & COPD Medication Protocol Failed - 7/4/2024  1:31 AM        Failed - Asthma Action Score greater than or equal to 20        Failed - AAP/ACT given in last 12 months     No data recorded  No data recorded  No data recorded  No data recorded          Passed - Appointment in past 6 or next 3 months      Recent Outpatient Visits              3 months ago [facility]     [facility]    8 months ago Controlled type 2 diabetes mellitus without complication, without long-term current use of insulin ([facility])    [facility]orge Cain,     Office Visit    11 months ago Controlled type 2 diabetes mellitus without complication, without long-term current use of insulin ([facility])    [facility] [place]     Office Visit    1 year ago Adult general medical exam    [facility]     Office Visit    1 year ago Uncontrolled type 2 diabetes mellitus with hyperglycemia ([facility])    [facility] [place],     Office Visit          Future Appointments         Provider Department Appt Notes    In 2 days [address] [facility] appointment with spouse    In 1 month Madelyn Parisi APRN [facility] CONSULT   Referred by Jorge Ritter DO   Reason Mass of breast, unspecified laterality- burning pain in right breast   Records in [facility]   [facility] preferred                           Future Appointments          Provider Department Appt Notes    In 2 days [address] [facility] scheduled appointment with spouse    In 1 month Madelyn Parisi APRN [facility] CONSULT   Referred by Jorge Ritter DO   Reason Mass of breast, unspecified laterality- burning pain in right breast   [facility]   [facility]          Recent Outpatient Visits              3 months ago Adult general medical exam    [facility], Jorge Tucker,     Office Visit    8 months ago Controlled type 2 diabetes mellitus without complication, without long-term current use of insulin (HCC)    [facility] Jorge Tucker,     Office Visit    11 months ago Controlled type 2 diabetes mellitus without complication, without long-term current use of insulin (HCC)    [facility], Jorge Tucker,     Office Visit    1 year ago Adult general medical exam    [facility]     [facility]    1 year ago Uncontrolled type 2 diabetes mellitus with hyperglycemia ([facility]CC)    [facility]     [facility]

## 2024-07-10 ENCOUNTER — HOSPITAL ENCOUNTER (OUTPATIENT)
Dept: MAMMOGRAPHY | Facility: HOSPITAL | Age: 54
Discharge: HOME | End: 2024-07-10
Attending: FAMILY MEDICINE
Payer: COMMERCIAL

## 2024-07-10 ENCOUNTER — HOSPITAL ENCOUNTER (OUTPATIENT)
Dept: ULTRASOUND IMAGING | Facility: HOSPITAL | Age: 54
Discharge: HOME | End: 2024-07-10
Attending: FAMILY MEDICINE
Payer: COMMERCIAL

## 2024-07-10 DIAGNOSIS — N64.4 BREAST PAIN, RIGHT: ICD-10-CM

## 2024-07-10 PROCEDURE — 77065 DX MAMMO INCL CAD UNI: CPT | Performed by: FAMILY MEDICINE

## 2024-07-10 PROCEDURE — 77061 BREAST TOMOSYNTHESIS UNI: CPT | Performed by: FAMILY MEDICINE

## 2024-07-10 PROCEDURE — 76642 ULTRASOUND BREAST LIMITED: CPT | Performed by: FAMILY MEDICINE

## 2024-08-23 ENCOUNTER — OFFICE VISIT (OUTPATIENT)
Dept: SURGERY | Facility: CLINIC | Age: 54
End: 2024-08-23
Payer: COMMERCIAL

## 2024-08-23 VITALS
RESPIRATION RATE: 18 BRPM | DIASTOLIC BLOOD PRESSURE: 64 MMHG | HEIGHT: 63 IN | WEIGHT: 161.38 LBS | HEART RATE: 88 BPM | OXYGEN SATURATION: 97 % | BODY MASS INDEX: 28.59 KG/M2 | TEMPERATURE: 98 F | SYSTOLIC BLOOD PRESSURE: 97 MMHG

## 2024-08-23 DIAGNOSIS — N64.4 BREAST PAIN, RIGHT: Primary | ICD-10-CM

## 2024-08-23 PROCEDURE — 3078F DIAST BP <80 MM HG: CPT

## 2024-08-23 PROCEDURE — 99203 OFFICE O/P NEW LOW 30 MIN: CPT

## 2024-08-23 PROCEDURE — 3008F BODY MASS INDEX DOCD: CPT

## 2024-08-23 PROCEDURE — 3074F SYST BP LT 130 MM HG: CPT

## 2024-08-28 NOTE — PROGRESS NOTES
Breast Surgery New Patient Consultation    This is the first visit for this 54 year old woman, referred by Dr. Ritter, who presents for evaluation of right breast pain.    History of Present Illness:   Ms. Brenda Herrera is a 54 year old woman who presents with a pain to her right breast at the 11 o'clock position 3 cm from the nipple.  She underwent a right breast diagnostic mammogram with ultrasound which showed no suspicious findings in the area of pain.  She denies any palpable masses, nipple discharge, skin changes, or axillary adenopathy. She does have breast pain. She does not have significant past history for breast diseases or breast biopsy. She does not have family history of breast cancer. She is here today for evaluation and recommendations for further therapy.        Past Medical History:    Diabetes (HCC)    High blood pressure    High cholesterol    Lipid screening    per     Type 2 diabetes mellitus without complication, without long-term current use of insulin (HCC)    Uterine fibroid    lupron injections.       Past Surgical History:   Procedure Laterality Date    Colonoscopy N/A 2022    Procedure: COLONOSCOPY;  Surgeon: Charlie Stewart MD;  Location: [facility] END[facility]    Hysterectomy         Gynecological History:  Pt is a   Pt was 25 years old at time of first pregnancy.    She has cumulative breastfeeding history of 12 months.  She achieved menarche at age 14 and LMP 2018  Age of Menopause: 43  Type: hysterectomy, unsure about ovaries  She denies any history of hormone replacement therapy f  She has history of oral contraceptive use for 1 years.  She denies infertility treatment to achieve pregnancy.    Medications:     montelukast 10 MG Oral Tab Take 1 tablet (10 mg total) by mouth nightly. 90 tablet 3    aspirin (ASPIRIN LOW DOSE) 81 MG Oral Tab EC Take 1 tablet (81 mg total) by mouth daily. 90 tablet 1    rosuvastatin 20 MG Oral Tab Take 1 tablet (20 mg total) by mouth nightly. For  cholesterol. 90 tablet 1    pioglitazone 30 MG Oral Tab Take 1 tablet (30 mg total) by mouth daily. For diabetes. 90 tablet 3    lisinopril 5 MG Oral Tab Take 1 tablet (5 mg total) by mouth daily. 90 tablet 3    glimepiride 4 MG Oral Tab Take 1 tablet (4 mg total) by mouth in the morning and 1 tablet (4 mg total) before bedtime. For diabetes. 180 tablet 3    mometasone furoate 50 MCG/ACT Nasal Suspension 2 sprays by Nasal route daily. 2 sprays each nostril once in the morning.  Squeeze nose afterwards and do not snort it down throat. 3 each 3    Accu-Chek Softclix Lancets Does not apply Misc TEST ONCE DAILY 100 each 3    Glucose Blood (ACCU-CHEK ALY PLUS) In Vitro Strip TEST ONCE DAILY 100 strip 3    Blood Glucose Monitoring Suppl Does not apply Kit Check blood sugar once daily. 1 kit 0    Lancets Does not apply Misc Check blood sugar daily. 100 each 3    Glucose Blood (ONETOUCH VERIO) In Vitro Strip Check blood sugar daily. 100 strip 3    levothyroxine 50 MCG Oral Tab Take 1 tablet (50 mcg total) by mouth before breakfast. 90 tablet 3    Blood Gluc Meter Disp-Strips Does not apply Device Needs 1 glucometer along with 100 test strips and 100 lancets with 11 refills each. Check sugars BID and prn. 1 each 0    Multiple Vitamins-Minerals (WOMENS MULTIVITAMIN PLUS) Oral Tab Take 1 tablet by mouth daily.         Allergies:    No Known Allergies    Family History:   Family History   Problem Relation Age of Onset    Diabetes Mother     High Blood Pressure Mother     Other (Other) Father         good health    Anemia Sister     Diabetes Sister     Cancer Neg        She is not of Ashkenazi Alevism ancestry.    Social History:  History   Alcohol Use No       History   Smoking Status    Never   Smokeless Tobacco    Never     Ms. Brenda Herrera is  with 3 children. She has 9 siblings. She is currently Employed full-time    Review of Systems:  General:   The patient denies, fever, +chills, +night sweats, fatigue,  generalized weakness, change in appetite or weight loss.    HEENT:     The patient denies eye irritation, cataracts, redness, glaucoma, yellowing of the eyes, change in vision, color blindness, or +wearing contacts/glasses. The patient denies hearing loss, ringing in the ears, ear drainage, earaches, nasal congestion, nose bleeds, snoring, pain in mouth/throat, hoarseness, change in voice, facial trauma.    Respiratory:  The patient denies chronic cough, phlegm, hemoptysis, pleurisy/chest pain, pneumonia, asthma, wheezing, difficulty in breathing with exertion, emphysema, chronic bronchitis, shortness of breath or abnormal sound when breathing.     Cardiovascular:  There is no history of chest pain, chest pressure/discomfort, palpitations, irregular heartbeat, fainting or near-fainting, difficulty breathing when lying flat, SOB/Coughing at night, swelling of the legs or chest pain while walking.    Breasts:  See history of present illness    Gastrointestinal:     There is no history of difficulty or pain with swallowing, reflux symptoms, vomiting, dark or bloody stools, constipation, yellowing of the skin, indigestion, nausea, change in bowel habits, diarrhea, abdominal pain or vomiting blood.     Genitourinary:  The patient denies frequent urination, needing to get up at night to urinate, urinary hesitancy or retaining urine, painful urination, urinary incontinence, decreased urine stream, blood in the urine or vaginal/penile discharge.    Skin:    The patient denies rash, itching, skin lesions, dry skin, change in skin color or change in moles.     Hematologic/Lymphatic:  The patient denies easily bruising or bleeding or persistent swollen glands or lymph nodes.     Musculoskeletal:  The patient denies muscle aches/pain, joint pain, stiff joints, neck pain, back pain or bone pain.    Neuropsychiatric:  There is no history of migraines or severe headaches, seizure/epilepsy, speech problems, coordination problems,  trembling/tremors, fainting/black outs, dizziness, memory problems, loss of sensation/numbness, problems walking, weakness, tingling or burning in hands/feet. There is no history of abusive relationship, bipolar disorder, +sleep disturbance, anxiety, depression or feeling of despair.    Endocrine:    There is no history of poor/slow wound healing, weight loss/gain, fertility or hormone problems, cold intolerance, +thyroid disease.     Allergic/Immunologic:  There is no history of hives, hay fever, angioedema or anaphylaxis.    BP 97/64 (BP Location: Left arm, Patient Position: Sitting, Cuff Size: adult)   Pulse 88   Temp 98.2 °F (36.8 °C) (Temporal)   Resp 18   Ht 1.6 m (5' 3\")   Wt 73.2 kg (161 lb 6.4 oz)   LMP  (LMP Unknown)   SpO2 97%   BMI 28.59 kg/m²     Physical Exam:  The patient is an alert, oriented, well-nourished and  well-developed woman who appears her stated age. Her speech patterns and movements are normal. Her affect is appropriate.    HEENT: The head is normocephalic. The neck is supple. The thyroid is not enlarged and is without palpable masses/nodules. There are no palpable masses. The trachea is in the midline. Conjunctiva are clear, non-icteric.    Chest: The chest expands symmetrically. The lungs are clear to auscultation.    Heart: The rhythm is regular.  There are no murmurs, rubs, gallops or thrills.    Breasts:  Her breasts are symmetrical with a cup size 34B.  Right breast: The skin, nipple ,and areola appear normal. There is no skin dimpling with movement of the pectoralis. There is no nipple retraction. No nipple discharge can be elicited. The parenchyma is mildly nodular. There are no dominant masses in the breast. The axillary tail is normal.  Left breast:   The skin, nipple, and areola appear normal. There is no skin dimpling with movement of the pectoralis. There is no nipple retraction. No nipple discharge can be elicited. The parenchyma is mildly nodular. There are no  dominant masses in the breast. The axillary tail is normal.    Abdomen:  The abdomen is soft, flat and non tender. The liver is not enlarged. There are no palpable masses.    Lymph Nodes:  The supraclavicular, axillary and cervical regions are free of significant lymphadenopathy.    Back: There is no vertebral column tenderness.    Skin: The skin appears normal. There are no suspicious appearing rashes or lesions.    Extremities: The extremities are without deformity, cyanosis or edema.    Impression:   Ms. Brenda Herrera is a 54 year old woman presents with right breast pain that has now improved.    Discussion and Plan:  I had a discussion with the Patient regarding her breast exam. On exam today I found no evidence of malignancy bilaterally.  I personally reviewed her recent mammogram and ultrasound and we discussed this at length.    With regard to her significant  mastalgia, we discussed that this is a common symptomatology, often exacerbated by  fluctuation in hormonal levels. While there is no good reliable treatment, it has been identified that avoiding caffeine, wearing a well-supportive bra and chest wall strengthening exercises may all help with alleviating symptoms. We also discussed that oral Vit E 400-600 IU daily has also been found to alleviate cyclical mastalgia in some patients secondary to its anti-inflammatory properties without significant side effects. For this reason, I have recommended that she initiate this to combat her symptoms.       Will plan for a bilateral screening mammogram in September 2024.  She can follow-up in the breast clinic as needed for any issues related to her bilateral breasts.  She was given ample opportunity for questions and those questions were answered to her satisfaction. She has been  encouraged to contact the office with any questions or concerns prior to her next appointment.     This encounter lasted a total of 30 minutes, more than 50% of which was  dedicated to the discussion of management options.         This note was created by Dragon voice recognition. Errors in content may be related to improper recognition by the system; efforts to review and correct have been done but errors may still exist. Please be advised the primary purpose of this note is for me to communicate medical care. Standard sentence structure is not always used. Medical terminology and medical abbreviations may be used. There may be grammatical, typographical, and automated fill ins that may have errors missed in proofreading.

## 2024-09-06 DIAGNOSIS — E11.9 CONTROLLED TYPE 2 DIABETES MELLITUS WITHOUT COMPLICATION, WITHOUT LONG-TERM CURRENT USE OF INSULIN (HCC): ICD-10-CM

## 2024-09-09 RX ORDER — CANAGLIFLOZIN AND METFORMIN HYDROCHLORIDE 150; 1000 MG/1; MG/1
2 TABLET, FILM COATED, EXTENDED RELEASE ORAL DAILY
Qty: 180 TABLET | Refills: 0 | OUTPATIENT
Start: 2024-09-09

## 2024-09-22 DIAGNOSIS — E11.9 CONTROLLED TYPE 2 DIABETES MELLITUS WITHOUT COMPLICATION, WITHOUT LONG-TERM CURRENT USE OF INSULIN (HCC): ICD-10-CM

## 2024-09-25 RX ORDER — DAPAGLIFLOZIN AND METFORMIN HYDROCHLORIDE 5; 1000 MG/1; MG/1
2 TABLET, FILM COATED, EXTENDED RELEASE ORAL DAILY
Qty: 180 TABLET | Refills: 3 | Status: SHIPPED | OUTPATIENT
Start: 2024-09-25

## 2024-09-25 RX ORDER — ROSUVASTATIN CALCIUM 20 MG/1
20 TABLET, COATED ORAL NIGHTLY
Qty: 90 TABLET | Refills: 3 | Status: SHIPPED | OUTPATIENT
Start: 2024-09-25

## 2024-09-26 NOTE — TELEPHONE ENCOUNTER
Refill passed per [facility] protocol.    Requested Prescriptions   Pending Prescriptions Disp Refills    DAPAGLIFLOZIN PRO-METFORMIN ER 5-1000 MG Oral Tablet 24 Hr [Pharmacy Med Name: Dapagliflozin Propanediol/Metformin Hydrochloride E Tab Pras] 180 tablet 0     Sig: TAKE TWO TABLETS BY MOUTH DAILY       Diabetes Medication Protocol Passed - 9/22/2024  3:00 AM        Passed - Last A1C < 7.5 and within past 6 months     Lab Results   Component Value Date    A1C 6.7 (H) 03/28/2024             Passed - In person appointment or virtual visit in the past 6 mos or appointment in next 3 mos     Recent Outpatient Visits              1 month ago Breast pain, right    [facility], [facility], Madelyn Vanegas APRN    Office Visit    6 months ago Adult general medical exam    [facility], RalphJorge Cain,     Office Visit    10 months ago Controlled type 2 diabetes mellitus without complication, without long-term current use of insulin ([facility]     [facility]    1 year ago Controlled type 2 diabetes mellitus without complication, without long-term current use of insulin ([facility])    [facility], [address], Jorge Tucker,     Office Visit    1 year ago Adult general medical exam    [facility], [place] Jorge Ritter, DO    Office Visit                      Passed - Microalbumin procedure in past 12 months or taking ACE/ARB        Passed - EGFRCR or GFRNAA > 50     GFR Evaluation  EGFRCR: 90 , resulted on 3/28/2024          Passed - GFR in the past 12 months          ROSUVASTATIN 20 MG Oral Tab [Pharmacy Med Name: Rosuvastatin Calcium 20 Mg Tab Nort] 90 tablet 0     Sig: Take 1 tablet (20 mg total) by mouth nightly. For cholesterol.       Cholesterol Medication Protocol Passed - 9/22/2024  3:00 AM        Passed - ALT  < 80     Lab Results   Component Value Date    ALT 18 03/28/2024             Passed - ALT resulted within past year        Passed - Lipid panel within past 12 months     Lab Results   Component Value Date    CHOLEST 179 03/28/2024    TRIG 198 (H) 03/28/2024    HDL 41 03/28/2024     (H) 03/28/2024    VLDL 33 (H) 03/28/2024    NONHDLC 138 (H) 03/28/2024             Passed - In person appointment or virtual visit in the past 12 mos or appointment in next 3 mos     Recent Outpatient Visits              1 month ago Breast pain, right    [facility], [facility], EmeryMadelyn Dodge APRN    Office Visit    6 months ago Adult general medical exam    [facility], Jorge Tucker, DO    Office Visit    10 months ago Controlled type 2 diabetes mellitus without complication, without long-term current use of insulin ([facility])    [facility], Dawson Tuckert, DO    Office Visit    1 year ago Controlled type 2 diabetes mellitus without complication, without long-term current use of insulin ([facility])    [facility], Jorge Tucker, DO    Office Visit    1 year ago Adult general medical exam    [facility], Dawson Tuckert, DO    Office Visit                           Recent Outpatient Visits              1 month ago Breast pain, right    [facility], [facility], Madelyn Vanegas APRN    Office Visit    6 months ago Adult general medical exam    [facility], Jorge Tucker, DO    Office Visit    10 months ago Controlled type 2 diabetes mellitus without complication, without long-term current use of insulin ([facility])    [facility], Dawson Tuckert, DO    Office Visit    1 year ago Controlled type 2 diabetes mellitus without complication, without long-term current  use of insulin (HCC)    [facility], [address], Jorge Tucker,     Office Visit    1 year ago Adult general medical exam    [facility], [address], Jorge Tucker, DO    Office Visit

## 2024-10-01 DIAGNOSIS — E03.9 ACQUIRED HYPOTHYROIDISM: ICD-10-CM

## 2024-10-03 ENCOUNTER — TELEPHONE (OUTPATIENT)
Dept: FAMILY MEDICINE CLINIC | Facility: CLINIC | Age: 54
End: 2024-10-03

## 2024-10-03 RX ORDER — LEVOTHYROXINE SODIUM 50 UG/1
50 TABLET ORAL
Qty: 90 TABLET | Refills: 3 | Status: SHIPPED | OUTPATIENT
Start: 2024-10-03

## 2024-10-03 NOTE — TELEPHONE ENCOUNTER
Spouse, states that the patient is requesting a refill on her levothyroxine medication. Patient is out of medication. Pharmacy: [facility] (listed)     Current Outpatient Medications   Medication Sig Dispense Refill    levothyroxine 50 MCG Oral Tab Take 1 tablet (50 mcg total) by mouth before breakfast. 90 tablet 3

## 2024-10-03 NOTE — TELEPHONE ENCOUNTER
Duplicate request, previously or already/will be addressed.  Pharmacy sent request 10/1/2024, will send to pharmacy. Passes protocol

## 2024-10-03 NOTE — TELEPHONE ENCOUNTER
Refill passes per [facility] protocol.    No future appointments.  Last office visit: 3/28/2024 (annual physical exam)    Requested Prescriptions   Pending Prescriptions Disp Refills    LEVOTHYROXINE 50 MCG Oral Tab [Pharmacy Med Name: Levothyroxine Sodium 50 Mcg Tab Lupi] 90 tablet 0     Sig: Take 1 tablet (50 mcg total) by mouth before breakfast.       Thyroid Medication Protocol Passed - 10/1/2024  4:51 PM        Passed - TSH in past 12 months        Passed - Last TSH value is normal     Lab Results   Component Value Date    TSH 1.199 03/28/2024                 Passed - In person appointment or virtual visit in the past 12 mos or appointment in next 3 mos     Recent Outpatient Visits              1 month ago Breast pain, right    [facility], Madelyn Vanegas APRN    Office Visit    6 months ago Adult general medical exam    [facility], Jorge Tucker,     Office Visit    10 months ago Controlled type 2 diabetes mellitus without complication, without long-term current use of insulin ([facility])    [facility] Jorge Tucker,     Office Visit    1 year ago Controlled type 2 diabetes mellitus without complication, without long-term current use of insulin ([facility])    [facility], Jorge Tucker,     Office Visit    1 year ago Adult general medical exam    [facility], Jorge Tucker,     Office Visit                             Recent Outpatient Visits              1 month ago Breast pain, right    [facility], Madelyn Vanegas APRN    Office Visit    6 months ago Adult general medical exam    [facility], Jorge Tucker,     Office Visit    10 months ago Controlled type 2 diabetes mellitus without complication, without long-term current use of  insulin ([facility])    [facility], [address], Jorge Tucker, DO    Office Visit    1 year ago Controlled type 2 diabetes mellitus without complication, without long-term current use of insulin ([facility])    [facility], [address], Jorge Tucker,     Office Visit    1 year ago Adult general medical exam    [facility], [address], Jorge Tucker,     Office Visit

## 2024-10-24 RX ORDER — ASPIRIN 81 MG/1
81 TABLET ORAL DAILY
Qty: 90 TABLET | Refills: 0 | Status: SHIPPED | OUTPATIENT
Start: 2024-10-24

## 2024-10-24 NOTE — TELEPHONE ENCOUNTER
Protocol failed for appointment only  90 day refill given on 10/24/24, appointment needed for further refills.    No future appointments.    Requested Prescriptions   Pending Prescriptions Disp Refills    ASPIRIN LOW DOSE 81 MG Oral Tab EC [Pharmacy Med Name: Aspirin Low Dose Ec 81 Mg Tab Gloria] 90 tablet 0     Sig: TOME 1 TABLETA POR VIA ORAL DIARIAMENTE       Aspirin Protocol Failed - 10/24/2024 11:20 AM        Failed - In person appointment or virtual visit in the past 6 mos or appointment in next 3 mos     Recent Outpatient Visits              2 months ago Breast pain, right    [facility]    Office Visit    7 months ago Adult general medical exam    [facility], RalphJorge Cain,     Office Visit    11 months ago Controlled type 2 diabetes mellitus without complication, without long-term current use of insulin ([facility])    [facility], Jorge Tucker,     Office Visit    1 year ago Controlled type 2 diabetes mellitus without complication, without long-term current use of insulin ([facility])    [facility], Jorge Tucker,     Office Visit    1 year ago Adult general medical exam    [facility], Jorge Tucker, DO    Office Visit                            Recent Outpatient Visits              2 months ago Breast pain, right    [facility], [facility], Madelyn Vanegas, SIL[facility]    Office Visit    7 months ago Adult general medical exam    [facility], Jorge Tucker,     Office Visit    11 months ago Controlled type 2 diabetes mellitus without complication, without long-term current use of insulin ([facility])    [facility] Jorge Tucker, DO    Office Visit    1 year ago Controlled type 2 diabetes mellitus  without complication, without long-term current use of insulin (HCC)    [facility], [address], Jorge Tucker, DO    Office Visit    1 year ago Adult general medical exam    [facility], [address], Jorge Tucker, DO    Office Visit

## 2024-11-12 NOTE — TELEPHONE ENCOUNTER
FMLA form for Dr. Jennifer Meyers received in [employer] Signed release, pt will pay at . Logged for processing.  NK The patient's goals for the shift include      The clinical goals for the shift include Pt to remain safe    Over the shift, the patient did make progress toward the following goals.   Problem: Safety - Adult  Goal: Free from fall injury  Outcome: Progressing     Problem: Discharge Planning  Goal: Discharge to home or other facility with appropriate resources  Outcome: Progressing     Problem: Chronic Conditions and Co-morbidities  Goal: Patient's chronic conditions and co-morbidity symptoms are monitored and maintained or improved  Outcome: Progressing     Problem: Fall/Injury  Goal: Use assistive devices by end of the shift  Outcome: Progressing     Problem: Skin  Goal: Participates in plan/prevention/treatment measures  Outcome: Progressing

## 2025-01-11 NOTE — ADDENDUM NOTE
Addended by: Milena Peters on: 11/16/2023 03:02 PM     Modules accepted: Orders Abormal VS: Temp > 100F or < 96.8F; SBP < 90 mmHG; HR > 120bpm; Resp > 24/min

## 2025-01-21 RX ORDER — ASPIRIN 81 MG/1
81 TABLET ORAL DAILY
Qty: 90 TABLET | Refills: 0 | Status: SHIPPED | OUTPATIENT
Start: 2025-01-21

## 2025-01-21 NOTE — TELEPHONE ENCOUNTER
Patient : please call patient to assist with scheduling appointment with Primary Care Provider    Protocol failed for appointment only  90 day refill given on 01/21/25, appointment needed for further refills.    No future appointments.    Requested Prescriptions   Pending Prescriptions Disp Refills    ASPIRIN LOW DOSE 81 MG Oral Tab EC [Pharmacy Med Name: Aspirin Low Dose Ec 81 Mg Tab Gloria] 90 tablet 0     Sig: TAKE 1 TABLET (81 MG TOTAL) BY MOUTH DAILY. TOME 1 TABLETA POR VIA ORAL DIARIAMENTE       Aspirin Protocol Failed - 1/21/2025 10:39 AM        Failed - In person appointment or virtual visit in the past 6 mos or appointment in next 3 mos     Recent Outpatient Visits              5 months ago Breast pain, right    [facility], Madelyn Vanegas APRN    Office Visit    9 months ago Adult general medical exam    [facility], Jorge Tucker,     Office Visit    1 year ago Controlled type 2 diabetes mellitus without complication, without long-term current use of insulin ([facility])    [facility], Jorge Tucker,     Office Visit    1 year ago Controlled type 2 diabetes mellitus without complication, without long-term current use of insulin ([facility])    [facility], Jorge Tucker,     Office Visit    1 year ago Adult general medical exam    [facility], Jorge Tucker,     Office Visit                      Passed - Medication is active on med list              Recent Outpatient Visits              5 months ago Breast pain, right    [facility], Madelyn Vanegas APRN    Office Visit    9 months ago [facility] general medical exam    [facility]     [facility] Visit    1 year ago Controlled type 2 diabetes mellitus  without complication, without long-term current use of insulin ([facility])    [facility], [address], Jorge Tucker, DO    Office Visit    1 year ago Controlled type 2 diabetes mellitus without complication, without long-term current use of insulin ([facility])    [facility], [address], Jorge Tucker,     Office Visit    1 year ago Adult general medical exam    [facility], [address], Jorge Tucker,     Office Visit

## 2025-03-06 DIAGNOSIS — E11.9 CONTROLLED TYPE 2 DIABETES MELLITUS WITHOUT COMPLICATION, WITHOUT LONG-TERM CURRENT USE OF INSULIN (HCC): ICD-10-CM

## 2025-03-10 RX ORDER — GLIMEPIRIDE 4 MG/1
4 TABLET ORAL 2 TIMES DAILY
Qty: 180 TABLET | Refills: 0 | Status: SHIPPED | OUTPATIENT
Start: 2025-03-10 | End: 2025-05-18

## 2025-03-10 NOTE — TELEPHONE ENCOUNTER
1st attempt - left message to call back with assist of Bettina/ id [id number] , per below and to see if patient wants to change visit to followup, since too soon for physical

## 2025-03-10 NOTE — TELEPHONE ENCOUNTER
Please Review. Protocol Failed; No Protocol     Lab Results   Component Value Date     A1C 6.7 (H) 03/28/2024

## 2025-05-06 RX ORDER — ASPIRIN 81 MG/1
81 TABLET ORAL DAILY
Qty: 30 TABLET | Refills: 0 | Status: SHIPPED | OUTPATIENT
Start: 2025-05-06

## 2025-05-06 NOTE — TELEPHONE ENCOUNTER
Refill passes per [facility] protocol.    Future Appointments   Date Time Provider Department Center   5/16/2025  8:30 AM Jorge Ritter DO [facility]  [facility]

## 2025-05-16 ENCOUNTER — OFFICE VISIT (OUTPATIENT)
Dept: FAMILY MEDICINE CLINIC | Facility: CLINIC | Age: 55
End: 2025-05-16

## 2025-05-16 ENCOUNTER — HOSPITAL ENCOUNTER (OUTPATIENT)
Dept: GENERAL RADIOLOGY | Age: 55
Discharge: HOME | End: 2025-05-16
Attending: FAMILY MEDICINE
Payer: COMMERCIAL

## 2025-05-16 ENCOUNTER — LAB ENCOUNTER (OUTPATIENT)
Dept: LAB | Age: 55
End: 2025-05-16
Attending: FAMILY MEDICINE
Payer: COMMERCIAL

## 2025-05-16 VITALS
WEIGHT: 157.19 LBS | TEMPERATURE: 97 F | SYSTOLIC BLOOD PRESSURE: 103 MMHG | DIASTOLIC BLOOD PRESSURE: 70 MMHG | BODY MASS INDEX: 27.85 KG/M2 | HEART RATE: 90 BPM | HEIGHT: 63 IN

## 2025-05-16 DIAGNOSIS — Z00.00 ADULT GENERAL MEDICAL EXAM: ICD-10-CM

## 2025-05-16 DIAGNOSIS — E11.9 CONTROLLED TYPE 2 DIABETES MELLITUS WITHOUT COMPLICATION, WITHOUT LONG-TERM CURRENT USE OF INSULIN (HCC): ICD-10-CM

## 2025-05-16 DIAGNOSIS — G89.29 CHRONIC RIGHT SHOULDER PAIN: ICD-10-CM

## 2025-05-16 DIAGNOSIS — M75.41 ROTATOR CUFF IMPINGEMENT SYNDROME OF RIGHT SHOULDER: Chronic | ICD-10-CM

## 2025-05-16 DIAGNOSIS — M25.542 ARTHRALGIA OF BOTH HANDS: ICD-10-CM

## 2025-05-16 DIAGNOSIS — Z00.00 ADULT GENERAL MEDICAL EXAM: Primary | ICD-10-CM

## 2025-05-16 DIAGNOSIS — E03.9 ACQUIRED HYPOTHYROIDISM: ICD-10-CM

## 2025-05-16 DIAGNOSIS — Z23 NEED FOR VACCINATION: ICD-10-CM

## 2025-05-16 DIAGNOSIS — I10 ESSENTIAL HYPERTENSION: ICD-10-CM

## 2025-05-16 DIAGNOSIS — M25.541 ARTHRALGIA OF BOTH HANDS: ICD-10-CM

## 2025-05-16 DIAGNOSIS — M25.511 CHRONIC RIGHT SHOULDER PAIN: ICD-10-CM

## 2025-05-16 DIAGNOSIS — E78.2 MIXED HYPERLIPIDEMIA: ICD-10-CM

## 2025-05-16 DIAGNOSIS — J30.89 OTHER ALLERGIC RHINITIS: ICD-10-CM

## 2025-05-16 DIAGNOSIS — Z12.31 VISIT FOR SCREENING MAMMOGRAM: ICD-10-CM

## 2025-05-16 LAB
ALBUMIN SERPL-MCNC: 5 G/DL (ref 3.2–4.8)
ALBUMIN/GLOB SERPL: 2.3 {RATIO} (ref 1–2)
ALP LIVER SERPL-CCNC: 65 U/L (ref 41–108)
ALT SERPL-CCNC: 15 U/L (ref 10–49)
ANION GAP SERPL CALC-SCNC: 9 MMOL/L (ref 0–18)
AST SERPL-CCNC: 16 U/L (ref ?–34)
BASOPHILS # BLD AUTO: 0.02 X10(3) UL (ref 0–0.2)
BASOPHILS NFR BLD AUTO: 0.3 %
BILIRUB SERPL-MCNC: 0.9 MG/DL (ref 0.3–1.2)
BUN BLD-MCNC: 15 MG/DL (ref 9–23)
BUN/CREAT SERPL: 20 (ref 10–20)
CALCIUM BLD-MCNC: 9.3 MG/DL (ref 8.7–10.4)
CHLORIDE SERPL-SCNC: 104 MMOL/L (ref 98–112)
CHOLEST SERPL-MCNC: 140 MG/DL (ref ?–200)
CO2 SERPL-SCNC: 27 MMOL/L (ref 21–32)
CREAT BLD-MCNC: 0.75 MG/DL (ref 0.55–1.02)
CREAT UR-SCNC: 61 MG/DL
DEPRECATED RDW RBC AUTO: 44 FL (ref 35.1–46.3)
EGFRCR SERPLBLD CKD-EPI 2021: 95 ML/MIN/1.73M2 (ref 60–?)
EOSINOPHIL # BLD AUTO: 0.06 X10(3) UL (ref 0–0.7)
EOSINOPHIL NFR BLD AUTO: 0.8 %
ERYTHROCYTE [DISTWIDTH] IN BLOOD BY AUTOMATED COUNT: 13.3 % (ref 11–15)
EST. AVERAGE GLUCOSE BLD GHB EST-MCNC: 151 MG/DL (ref 68–126)
FASTING PATIENT LIPID ANSWER: YES
FASTING STATUS PATIENT QL REPORTED: YES
GLOBULIN PLAS-MCNC: 2.2 G/DL (ref 2–3.5)
GLUCOSE BLD-MCNC: 63 MG/DL (ref 70–99)
HBA1C MFR BLD: 6.9 % (ref ?–5.7)
HCT VFR BLD AUTO: 37.3 % (ref 35–48)
HDLC SERPL-MCNC: 41 MG/DL (ref 40–59)
HGB BLD-MCNC: 12.4 G/DL (ref 12–16)
IMM GRANULOCYTES # BLD AUTO: 0.02 X10(3) UL (ref 0–1)
IMM GRANULOCYTES NFR BLD: 0.3 %
LDLC SERPL CALC-MCNC: 61 MG/DL (ref ?–100)
LYMPHOCYTES # BLD AUTO: 2.58 X10(3) UL (ref 1–4)
LYMPHOCYTES NFR BLD AUTO: 32.7 %
MCH RBC QN AUTO: 29.9 PG (ref 26–34)
MCHC RBC AUTO-ENTMCNC: 33.2 G/DL (ref 31–37)
MCV RBC AUTO: 89.9 FL (ref 80–100)
MICROALBUMIN UR-MCNC: 0.3 MG/DL
MICROALBUMIN/CREAT 24H UR-RTO: 4.9 UG/MG (ref ?–30)
MONOCYTES # BLD AUTO: 0.47 X10(3) UL (ref 0.1–1)
MONOCYTES NFR BLD AUTO: 6 %
NEUTROPHILS # BLD AUTO: 4.73 X10 (3) UL (ref 1.5–7.7)
NEUTROPHILS # BLD AUTO: 4.73 X10(3) UL (ref 1.5–7.7)
NEUTROPHILS NFR BLD AUTO: 59.9 %
NONHDLC SERPL-MCNC: 99 MG/DL (ref ?–130)
OSMOLALITY SERPL CALC.SUM OF ELEC: 289 MOSM/KG (ref 275–295)
PLATELET # BLD AUTO: 302 10(3)UL (ref 150–450)
POTASSIUM SERPL-SCNC: 4.4 MMOL/L (ref 3.5–5.1)
PROT SERPL-MCNC: 7.2 G/DL (ref 5.7–8.2)
RBC # BLD AUTO: 4.15 X10(6)UL (ref 3.8–5.3)
SODIUM SERPL-SCNC: 140 MMOL/L (ref 136–145)
TRIGL SERPL-MCNC: 239 MG/DL (ref 30–149)
TSI SER-ACNC: 0.91 UIU/ML (ref 0.55–4.78)
VLDLC SERPL CALC-MCNC: 35 MG/DL (ref 0–30)
WBC # BLD AUTO: 7.9 X10(3) UL (ref 4–11)

## 2025-05-16 PROCEDURE — 85025 COMPLETE CBC W/AUTO DIFF WBC: CPT

## 2025-05-16 PROCEDURE — 3074F SYST BP LT 130 MM HG: CPT | Performed by: FAMILY MEDICINE

## 2025-05-16 PROCEDURE — 83036 HEMOGLOBIN GLYCOSYLATED A1C: CPT

## 2025-05-16 PROCEDURE — 99396 PREV VISIT EST AGE 40-64: CPT | Performed by: FAMILY MEDICINE

## 2025-05-16 PROCEDURE — 99214 OFFICE O/P EST MOD 30 MIN: CPT | Performed by: FAMILY MEDICINE

## 2025-05-16 PROCEDURE — 84443 ASSAY THYROID STIM HORMONE: CPT

## 2025-05-16 PROCEDURE — 90471 IMMUNIZATION ADMIN: CPT | Performed by: FAMILY MEDICINE

## 2025-05-16 PROCEDURE — 3078F DIAST BP <80 MM HG: CPT | Performed by: FAMILY MEDICINE

## 2025-05-16 PROCEDURE — 82043 UR ALBUMIN QUANTITATIVE: CPT

## 2025-05-16 PROCEDURE — G2211 COMPLEX E/M VISIT ADD ON: HCPCS | Performed by: FAMILY MEDICINE

## 2025-05-16 PROCEDURE — 80053 COMPREHEN METABOLIC PANEL: CPT

## 2025-05-16 PROCEDURE — 36415 COLL VENOUS BLD VENIPUNCTURE: CPT

## 2025-05-16 PROCEDURE — 90677 PCV20 VACCINE IM: CPT | Performed by: FAMILY MEDICINE

## 2025-05-16 PROCEDURE — 80061 LIPID PANEL: CPT

## 2025-05-16 PROCEDURE — 82570 ASSAY OF URINE CREATININE: CPT

## 2025-05-16 PROCEDURE — 3008F BODY MASS INDEX DOCD: CPT | Performed by: FAMILY MEDICINE

## 2025-05-16 RX ORDER — MOMETASONE FUROATE MONOHYDRATE 50 UG/1
2 SPRAY, METERED NASAL DAILY
Qty: 3 EACH | Refills: 3 | Status: SHIPPED | OUTPATIENT
Start: 2025-05-16

## 2025-05-16 RX ORDER — MONTELUKAST SODIUM 10 MG/1
10 TABLET ORAL NIGHTLY
Qty: 90 TABLET | Refills: 3 | Status: SHIPPED | OUTPATIENT
Start: 2025-05-16

## 2025-05-16 RX ORDER — LISINOPRIL 5 MG/1
5 TABLET ORAL DAILY
Qty: 90 TABLET | Refills: 3 | Status: SHIPPED | OUTPATIENT
Start: 2025-05-16

## 2025-05-16 NOTE — PROGRESS NOTES
Patient ID: Brenda Herrera is a 55 year old female.         The following individual(s) verbally consented to be recorded using ambient AI listening technology and understand that they can each withdraw their consent to this listening technology at any point by asking the clinician to turn off or pause the recording:    Patient name: Brenda Herrera  Additional names: [employer] interpre[employer]  JANIS #[id number] .           HPI  Chief Complaint   Patient presents with    Routine Physical       History of Present Illness  Brenda Herrera is a 55-year-old female with diabetes and arthritis who presents for a follow-up visit.    She has arthritis in both hands, particularly affecting her fingers, with progression causing significant pain, especially at night, impacting her sleep. She experiences morning stiffness in her fingers.    She has been experiencing right shoulder pain for nearly a year, which is more pronounced at night, preventing her from leaning on the shoulder. The pain is less noticeable during the day when she is active.  She is right-handed.    No tingling or pain in her feet and no chest pain. She uses an allergy spray and montelukast occasionally, particularly when her allergies flare up due to blooming flowers.      Health Maintenance   Topic Date Due    Pneumococcal Vaccine: 50+ Years (3 of 3 - PCV20 or PCV21) 07/24/2023    COVID-19 Vaccine (4 - 2024-25 season) 09/01/2024    Diabetes Care A1C  09/28/2024    Annual Depression Screening  01/01/2025    Diabetes Care: Microalb/Creat Ratio (Annual)  01/01/2025    Diabetes Care Dilated Eye Exam  02/23/2025    Diabetes Care Foot Exam  03/28/2025    Diabetes Care: GFR  03/28/2025    Annual Physical  03/28/2025    Mammogram  07/10/2025    Influenza Vaccine (Season Ended) 10/01/2025    DTaP,Tdap,and Td Vaccines (2 - Td or Tdap) 11/14/2025    Colorectal Cancer Screening  02/18/2029    Zoster Vaccines  Completed    Meningococcal B Vaccine  Aged Out        Results  RADIOLOGY  Hand X-ray: Arthritic changes predominantly in the index finger bilaterally.  Right Shoulder X-ray: Probable arthritis and inflammation.    =======================================================    Lab Results   Component Value Date    WBC 6.8 03/28/2024    RBC 4.22 03/28/2024    HGB 12.6 03/28/2024    HCT 38.0 03/28/2024    .0 03/28/2024    MPV 8.2 03/31/2018    MCV 90.0 03/28/2024    MCH 29.9 03/28/2024    MCHC 33.2 03/28/2024    RDW 13.3 03/28/2024    NEPRELIM 4.21 03/28/2024    NEPERCENT 61.9 03/28/2024    LYPERCENT 31.0 03/28/2024    MOPERCENT 5.7 03/28/2024    EOPERCENT 0.7 03/28/2024    BAPERCENT 0.4 03/28/2024    NE 4.21 03/28/2024    LYMABS 2.11 03/28/2024    MOABSO 0.39 03/28/2024    EOABSO 0.05 03/28/2024    BAABSO 0.03 03/28/2024       Lab Results   Component Value Date    GLU 86 03/28/2024    BUN 10 03/28/2024    BUNCREA 12.7 03/28/2024    CREATSERUM 0.79 03/28/2024    ANIONGAP 6 03/28/2024    GFRNAA 92 05/20/2022    GFRAA 106 05/20/2022    CA 9.7 03/28/2024    OSMOCALC 284 03/28/2024    ALKPHO 64 03/28/2024    AST 18 03/28/2024    ALT 18 03/28/2024    ALKPHOS 58 01/16/2016    BILT 0.9 03/28/2024    TP 7.3 03/28/2024    ALB 4.7 03/28/2024    GLOBULIN 2.6 (L) 03/28/2024    AGRATIO 1.5 01/16/2016     03/28/2024    K 4.3 03/28/2024     03/28/2024    CO2 28.0 03/28/2024       Lab Results   Component Value Date    GLU 86 03/28/2024    BUN 10 03/28/2024    CREATSERUM 0.79 03/28/2024    BUNCREA 12.7 03/28/2024    ANIONGAP 6 03/28/2024    GFRAA 106 05/20/2022    GFRNAA 92 05/20/2022    CA 9.7 03/28/2024     03/28/2024    K 4.3 03/28/2024     03/28/2024    CO2 28.0 03/28/2024    OSMOCALC 284 03/28/2024       Lab Results   Component Value Date    COLORUR Yellow 10/31/2022    CLARITY Clear 10/31/2022    SPECGRAVITY 1.020 10/31/2022    GLUUR >=500 (A) 10/31/2022    BILUR Negative 10/31/2022    KETUR Negative 10/31/2022    BLOODURINE Negative 10/31/2022     PHURINE 6.0 10/31/2022    PROUR Negative 10/31/2022    UROBILINOGEN <2.0 10/31/2022    NITRITE Negative 10/31/2022    LEUUR Negative 10/31/2022    NMIC Microscopic not indicated 10/31/2022     Lab Results   Component Value Date     (H) 03/28/2024    A1C 6.7 (H) 03/28/2024    A1C 6.8 (H) 11/11/2023    A1C 6.8 (H) 07/22/2023       Lab Results   Component Value Date    MALBP <0.30 03/28/2024    CREUR 32.10 03/28/2024       Lab Results   Component Value Date    CHOLEST 179 03/28/2024    TRIG 198 (H) 03/28/2024    HDL 41 03/28/2024     (H) 03/28/2024    VLDL 33 (H) 03/28/2024    NONHDLC 138 (H) 03/28/2024    CALCNONHDL 141 (H) 01/16/2016    CALCNONHDL 140 (H) 02/07/2015    CALCNONHDL 153 (H) 10/19/2013     Free T4 (ng/dL)   Date Value   08/26/2022 0.8     TSH (mIU/mL)   Date Value   03/28/2024 1.199     TSH (S) (uIU/mL)   Date Value   02/07/2015 2.83       No results found for: \"B12\", \"VITB12\"    No results found for: \"IRON\", \"IRONTOT\"    No results found for: \"SAT\"    No results found for: \"IRON\", \"IRONTOT\", \"TIBC\", \"IRONSAT\", \"TRANSFERRIN\", \"TIBCP\", \"IRONBIND\", \"SAT\", \"SATUR\"    No results found for: \"JOSIAH\"    No results found for: \"VITD\", \"QVITD\", \"FYFU97PC\"  No results found for: \"PSA\", \"QPSA\", \"TOTPSASCREEN\"    =======================================================    Wt Readings from Last 6 Encounters:   05/16/25 157 lb 3.2 oz (71.3 kg)   08/23/24 161 lb 6.4 oz (73.2 kg)   03/28/24 160 lb 3.2 oz (72.7 kg)   11/11/23 155 lb (70.3 kg)   07/22/23 159 lb (72.1 kg)   03/31/23 154 lb (69.9 kg)               BMI Readings from Last 6 Encounters:   05/16/25 27.85 kg/m²   08/23/24 28.59 kg/m²   03/28/24 28.38 kg/m²   11/11/23 27.46 kg/m²   07/22/23 28.17 kg/m²   03/31/23 27.28 kg/m²       BP Readings from Last 6 Encounters:   05/16/25 103/70   08/23/24 97/64   03/28/24 99/64   11/11/23 103/63   07/22/23 102/70   03/31/23 97/65         Review of Systems  No exertional cardiac chest pain or shortness of breath  unless stated in HPI which would take precedence.  See HPI for further review of systems.    Past Medical History:    Diabetes (HCC)    High blood pressure    High cholesterol    Lipid screening    per NG    Type 2 diabetes mellitus without complication, without long-term current use of insulin (HCC)    Uterine fibroid    lupron injections.       Past Surgical History:   Procedure Laterality Date    Colonoscopy N/A 2/18/2022    Procedure: COLONOSCOPY;  Surgeon: Charlie Stewart MD;  Location: [facility] ENDO    Hysterectomy         Social History     Socioeconomic History    Marital status:      Spouse name: Not on file    Number of children: Not on file    Years of education: Not on file    Highest education level: Not on file   Occupational History    Not on file   Tobacco Use    Smoking status: Never    Smokeless tobacco: Never   Vaping Use    Vaping status: Not on file   Substance and Sexual Activity    Alcohol use: No    Drug use: No    Sexual activity: Not on file   Other Topics Concern     Service Not Asked    Blood Transfusions Not Asked    Caffeine Concern Yes     Comment: coffee, rarely    Occupational Exposure Not Asked    Hobby Hazards Not Asked    Sleep Concern Not Asked    Stress Concern Not Asked    Weight Concern Not Asked    Special Diet Not Asked    Back Care Not Asked    Exercise Not Asked    Bike Helmet Not Asked    Seat Belt Not Asked    Self-Exams Not Asked   Social History Narrative    Not on file     Social Drivers of Health     Food Insecurity: Not on file   Transportation Needs: Not on file   Stress: Not on file   Housing Stability: Not on file          Current Outpatient Medications   Medication Sig Dispense Refill    aspirin (ASPIRIN EC ADULT LOW DOSE) 81 MG Oral Tab EC Take 1 tablet (81 mg total) by mouth daily. TOME 1 TABLETA POR VIA ORAL DIARIAMENTE. **Please address all refills at your upcoming appointment.** 30 tablet 0    glimepiride 4 MG Oral Tab Take 1 tablet (4 mg  total) by mouth in the morning and 1 tablet (4 mg total) before bedtime. For diabetes. 180 tablet 0    levothyroxine 50 MCG Oral Tab Take 1 tablet (50 mcg total) by mouth before breakfast. 90 tablet 3    Dapagliflozin Pro-metFORMIN ER 5-1000 MG Oral Tablet 24 Hr Take 2 tablets by mouth daily. 180 tablet 3    rosuvastatin 20 MG Oral Tab Take 1 tablet (20 mg total) by mouth nightly. For cholesterol. 90 tablet 3    montelukast 10 MG Oral Tab Take 1 tablet (10 mg total) by mouth nightly. 90 tablet 3    pioglitazone 30 MG Oral Tab Take 1 tablet (30 mg total) by mouth daily. For diabetes. 90 tablet 3    lisinopril 5 MG Oral Tab Take 1 tablet (5 mg total) by mouth daily. 90 tablet 3    mometasone furoate 50 MCG/ACT Nasal Suspension 2 sprays by Nasal route daily. 2 sprays each nostril once in the morning.  Squeeze nose afterwards and do not snort it down throat. 3 each 3    Accu-Chek Softclix Lancets Does not apply Misc TEST ONCE DAILY 100 each 3    Glucose Blood (ACCU-CHEK ALY PLUS) In Vitro Strip TEST ONCE DAILY 100 strip 3    Blood Glucose Monitoring Suppl Does not apply Kit Check blood sugar once daily. 1 kit 0    Lancets Does not apply Misc Check blood sugar daily. 100 each 3    Glucose Blood (ONETOUCH VERIO) In Vitro Strip Check blood sugar daily. 100 strip 3    Blood Gluc Meter Disp-Strips Does not apply Device Needs 1 glucometer along with 100 test strips and 100 lancets with 11 refills each. Check sugars BID and prn. 1 each 0    Multiple Vitamins-Minerals (WOMENS MULTIVITAMIN PLUS) Oral Tab Take 1 tablet by mouth daily.       Allergies:No Known Allergies   PHYSICAL EXAM:   Physical Exam  Physical Exam   Constitutional: . She appears well-developed and well-nourished. No distress.   HENT:   Head: Normocephalic.   Right Ear: Tympanic membrane and ear canal normal.   Left Ear: Tympanic membrane and ear canal normal.   Nose: Does not sound congested  Mouth/Throat: Oropharynx is clear and moist and mucous membranes  are normal.   Eyes: Conjunctivae and EOM are normal. Pupils are equal, round, and reactive to light.   Neck: Normal range of motion. Neck supple. No thyromegaly present.   Cardiovascular: Normal rate, regular rhythm and no murmur heard.  Pulmonary/Chest: Effort normal and breath sounds normal. No respiratory distress.   Abdominal: Soft. Bowel sounds are normal. There is no hepatosplenomegaly. There is no tenderness.   Lymphadenopathy:     She has no  cervical adenopathy.   Neurological: She is alert and oriented to person, place, and time . She has normal reflexes. No cranial nerve deficit.   Skin: Skin is warm and dry. No rash noted.  No pallor.  Psychiatric: She has a normal mood and affect   Bilateral hands with Heberden's nodes at the DIP joints of both index fingers with some flexion deformity at the DIP joint.  Otherwise no noticeable swelling at the PIP joint or MCP joints bilaterally.  Bilateral barefoot skin diabetic exam: visualized feet and the appearance is normal.  Bilateral sensation of both feet is normal.  Pedal pulse exam of both lower legs/feet is normal as well.    This 55 year old female is A&O in no acute distress.  SHOULDER EXAM: RIGHT   Range of Motion-Abduction WNL   Forward Flexion WNL   IR WNL   ER WNL   Atrophy None   Bruising None   Strength       Supraspinatus 5/5      Internal Rotation 5/5      External Rotation 5/5      Speed's Test 5/5   Stability/Laxity WNL   Pain in Impingement Arc none   Apprehension Sign negative    Pain        Rotator Cuff resistance none       Bicipital Groove none       AC joint +   Max Tenderness     AC joint   Neurovascular status Intact       Crepitus w/ ROM negative   Cross Body AC pain +   Hawkin's Impingement negative   Neer's Impingement Positive      Physical Exam  CARDIOVASCULAR: Strong pulses in feet.  MUSCULOSKELETAL: Normal shoulder range of motion. Arthritic changes on index fingers bilaterally. Tenderness in right shoulder.    Vitals  reviewed.  Vitals:    05/16/25 0835   BP: 103/70   BP Location: Left arm   Patient Position: Sitting   Cuff Size: adult   Pulse: 90   Temp: 97.4 °F (36.3 °C)   TempSrc: Tympanic   Weight: 157 lb 3.2 oz (71.3 kg)   Height: 5' 3\" (1.6 m)       Blood pressure 103/70, pulse 90, temperature 97.4 °F (36.3 °C), temperature source Tympanic, height 5' 3\" (1.6 m), weight 157 lb 3.2 oz (71.3 kg), not currently breastfeeding.             ASSESSMENT/PLAN:     Diagnoses and all orders for this visit:    Adult general medical exam  -     CBC With Differential With Platelet; Future  -     Comp Metabolic Panel (14); Future  -     Lipid Panel; Future  Labs ordered.  She is fasting.  Controlled type 2 diabetes mellitus without complication, without long-term current use of insulin (HCC)  -     Microalb/Creat Ratio, Random Urine; Future  -     Hemoglobin A1C; Future  -     OPHTHALMOLOGY - INTERNAL  -     Immunization Admin Counseling, 1st Component, 18 years and older  -     PCV20 (Prevnar 20)  Will see how her diabetes is.  She does follow-up with ophthalmology.  I did the foot exam myself today.  Acquired hypothyroidism  -     TSH W Reflex To Free T4; Future    Other allergic rhinitis  -     mometasone furoate 50 MCG/ACT Nasal Suspension; 2 sprays by Nasal route daily. 2 sprays each nostril once in the morning.  Squeeze nose afterwards and do not snort it down throat.  -     montelukast 10 MG Oral Tab; Take 1 tablet (10 mg total) by mouth nightly.    Mixed hyperlipidemia  Compliant with statin  Essential hypertension  -     lisinopril 5 MG Oral Tab; Take 1 tablet (5 mg total) by mouth daily.  Nicely controlled, continue present management  Visit for screening mammogram  -     [facility] GHULAM 2D+3D SCREENING BILAT (CPT=77067/25663); Future    Arthralgia of both hands  -     XR HAND (MIN 3 VIEWS), RIGHT (CPT=73130); Future  -     XR HAND (MIN 3 VIEWS), LEFT (CPT=73130); Future  Await x-rays to see what type of specialist I need to send her  for for the hands as well as the x-ray of the right shoulder.  Chronic right shoulder pain  -     XR SHOULDER, COMPLETE (MIN 2 VIEWS), RIGHT (CPT=73030); Future    Rotator cuff impingement syndrome of right shoulder  -     XR SHOULDER, COMPLETE (MIN 2 VIEWS), RIGHT (CPT=73030); Future    Need for vaccination  -     Immunization Admin Counseling, 1st Component, 18 years and older  -     PCV20 (Prevnar 20)        Referrals (if applicable)  Orders Placed This Encounter   Procedures    OPHTHALMOLOGY - INTERNAL     Corneal specialist     Referral Priority:   Routine     Referral Type:   OFFICE VISIT     Referred to Provider:   Isra Jain MD     Requested Specialty:   OPHTHALMOLOGY     Number of Visits Requested:   3         Follow up if symptoms persist.  Take medicine (if given) as prescribed.  Approach to treatment discussed and patient/family member understands and agrees to plan.     No follow-ups on file.      Assessment & Plan  Arthritis  Arthritis in both hands, particularly affecting the index fingers, with morning stiffness. Right shoulder pain likely due to arthritis and inflammation, persisting for nearly a year, exacerbated at night and with certain movements.  - Order x-rays of both hands and right shoulder  - Refer to rheumatologist for arthritis management    Type 2 diabetes mellitus  Type 2 diabetes mellitus under management. Regular follow-up with eye specialist recommended due to diabetes.  - Order blood and urine tests  - Refer to eye specialist (Dr. Degroot)    Allergic rhinitis  Allergic rhinitis managed with nasal spray and montelukast as needed, particularly during allergy season.    General Health Maintenance  Routine health maintenance discussed, including the need for a mammogram and pneumonia vaccination, particularly important for diabetes management to protect against lung infections.  - Order mammogram by July  - Administer pneumonia vaccination    Jorge Ritter DO  5/16/2025

## 2025-05-17 ENCOUNTER — HOSPITAL ENCOUNTER (OUTPATIENT)
Dept: GENERAL RADIOLOGY | Age: 55
Discharge: HOME | End: 2025-05-17
Attending: FAMILY MEDICINE
Payer: COMMERCIAL

## 2025-05-17 PROCEDURE — 73130 X-RAY EXAM OF HAND: CPT | Performed by: FAMILY MEDICINE

## 2025-05-17 PROCEDURE — 73030 X-RAY EXAM OF SHOULDER: CPT | Performed by: FAMILY MEDICINE

## 2025-06-03 RX ORDER — ASPIRIN 81 MG/1
81 TABLET ORAL DAILY
Qty: 90 TABLET | Refills: 3 | Status: SHIPPED | OUTPATIENT
Start: 2025-06-03

## 2025-06-03 NOTE — TELEPHONE ENCOUNTER
Refill passed per [facility] protocols.    Requested Prescriptions   Pending Prescriptions Disp Refills    ASPIRIN LOW DOSE 81 MG Oral Tab EC [Pharmacy Med Name: Aspirin Adult Low Dose Ec 81 Mg Tab Gloria] 90 tablet 3     Sig: Take 1 tablet (81 mg total) by mouth daily. TOME 1 TABLETA POR VIA ORAL DIARIAMENTE.        Aspirin Protocol Passed - 6/3/2025 10:22 AM

## 2025-07-26 ENCOUNTER — HOSPITAL ENCOUNTER (OUTPATIENT)
Dept: MAMMOGRAPHY | Age: 55
Discharge: HOME | End: 2025-07-26
Attending: FAMILY MEDICINE
Payer: COMMERCIAL

## 2025-07-26 DIAGNOSIS — Z12.31 VISIT FOR SCREENING MAMMOGRAM: ICD-10-CM

## 2025-07-26 PROCEDURE — 77067 SCR MAMMO BI INCL CAD: CPT | Performed by: FAMILY MEDICINE

## 2025-07-26 PROCEDURE — 77063 BREAST TOMOSYNTHESIS BI: CPT | Performed by: FAMILY MEDICINE

## 2025-07-29 ENCOUNTER — LAB ENCOUNTER (OUTPATIENT)
Dept: LAB | Facility: HOSPITAL | Age: 55
End: 2025-07-29
Attending: INTERNAL MEDICINE

## 2025-07-29 ENCOUNTER — HOSPITAL ENCOUNTER (OUTPATIENT)
Dept: GENERAL RADIOLOGY | Facility: HOSPITAL | Age: 55
Discharge: HOME | End: 2025-07-29
Attending: INTERNAL MEDICINE

## 2025-07-29 ENCOUNTER — OFFICE VISIT (OUTPATIENT)
Age: 55
End: 2025-07-29

## 2025-07-29 VITALS
DIASTOLIC BLOOD PRESSURE: 65 MMHG | HEIGHT: 63 IN | HEART RATE: 72 BPM | WEIGHT: 157.81 LBS | RESPIRATION RATE: 16 BRPM | BODY MASS INDEX: 27.96 KG/M2 | SYSTOLIC BLOOD PRESSURE: 100 MMHG

## 2025-07-29 DIAGNOSIS — M15.4 EROSIVE (OSTEO)ARTHRITIS: ICD-10-CM

## 2025-07-29 DIAGNOSIS — G89.29 CHRONIC LOW BACK PAIN, UNSPECIFIED BACK PAIN LATERALITY, UNSPECIFIED WHETHER SCIATICA PRESENT: ICD-10-CM

## 2025-07-29 DIAGNOSIS — M25.50 ARTHRALGIA, UNSPECIFIED JOINT: ICD-10-CM

## 2025-07-29 DIAGNOSIS — M54.50 CHRONIC LOW BACK PAIN, UNSPECIFIED BACK PAIN LATERALITY, UNSPECIFIED WHETHER SCIATICA PRESENT: ICD-10-CM

## 2025-07-29 DIAGNOSIS — M15.4 EROSIVE (OSTEO)ARTHRITIS: Primary | ICD-10-CM

## 2025-07-29 LAB
CRP SERPL-MCNC: <0.5 MG/DL (ref ?–0.5)
ERYTHROCYTE [SEDIMENTATION RATE] IN BLOOD: 41 MM/HR (ref 0–30)
RHEUMATOID FACT SERPL-ACNC: 6.7 IU/ML (ref ?–14)

## 2025-07-29 PROCEDURE — 72202 X-RAY EXAM SI JOINTS 3/> VWS: CPT | Performed by: INTERNAL MEDICINE

## 2025-07-29 PROCEDURE — 99204 OFFICE O/P NEW MOD 45 MIN: CPT | Performed by: INTERNAL MEDICINE

## 2025-07-29 PROCEDURE — 72100 X-RAY EXAM L-S SPINE 2/3 VWS: CPT | Performed by: INTERNAL MEDICINE

## 2025-07-29 PROCEDURE — 86431 RHEUMATOID FACTOR QUANT: CPT

## 2025-07-29 PROCEDURE — 3074F SYST BP LT 130 MM HG: CPT | Performed by: INTERNAL MEDICINE

## 2025-07-29 PROCEDURE — 85652 RBC SED RATE AUTOMATED: CPT

## 2025-07-29 PROCEDURE — 3078F DIAST BP <80 MM HG: CPT | Performed by: INTERNAL MEDICINE

## 2025-07-29 PROCEDURE — 86140 C-REACTIVE PROTEIN: CPT

## 2025-07-29 PROCEDURE — 36415 COLL VENOUS BLD VENIPUNCTURE: CPT

## 2025-07-29 PROCEDURE — 86200 CCP ANTIBODY: CPT

## 2025-07-29 PROCEDURE — 3008F BODY MASS INDEX DOCD: CPT | Performed by: INTERNAL MEDICINE

## 2025-07-29 RX ORDER — CELECOXIB 200 MG/1
200 CAPSULE ORAL 2 TIMES DAILY PRN
Qty: 60 CAPSULE | Refills: 1 | Status: SHIPPED | OUTPATIENT
Start: 2025-07-29

## 2025-07-30 LAB — CCP IGG SERPL-ACNC: 1.2 U/ML (ref 0–6.9)

## 2025-08-09 ENCOUNTER — OFFICE VISIT (OUTPATIENT)
Dept: FAMILY MEDICINE CLINIC | Facility: CLINIC | Age: 55
End: 2025-08-09

## 2025-08-09 ENCOUNTER — LAB ENCOUNTER (OUTPATIENT)
Dept: LAB | Age: 55
End: 2025-08-09
Attending: FAMILY MEDICINE

## 2025-08-09 VITALS
BODY MASS INDEX: 27.96 KG/M2 | SYSTOLIC BLOOD PRESSURE: 108 MMHG | DIASTOLIC BLOOD PRESSURE: 71 MMHG | HEART RATE: 88 BPM | TEMPERATURE: 97 F | HEIGHT: 63 IN | WEIGHT: 157.81 LBS

## 2025-08-09 DIAGNOSIS — I10 ESSENTIAL HYPERTENSION: ICD-10-CM

## 2025-08-09 DIAGNOSIS — E11.9 CONTROLLED TYPE 2 DIABETES MELLITUS WITHOUT COMPLICATION, WITHOUT LONG-TERM CURRENT USE OF INSULIN (HCC): Primary | ICD-10-CM

## 2025-08-09 DIAGNOSIS — E03.9 ACQUIRED HYPOTHYROIDISM: ICD-10-CM

## 2025-08-09 DIAGNOSIS — E11.9 CONTROLLED TYPE 2 DIABETES MELLITUS WITHOUT COMPLICATION, WITHOUT LONG-TERM CURRENT USE OF INSULIN (HCC): ICD-10-CM

## 2025-08-09 DIAGNOSIS — M25.542 ARTHRALGIA OF BOTH HANDS: ICD-10-CM

## 2025-08-09 DIAGNOSIS — M25.541 ARTHRALGIA OF BOTH HANDS: ICD-10-CM

## 2025-08-09 DIAGNOSIS — E78.2 MIXED HYPERLIPIDEMIA: ICD-10-CM

## 2025-08-09 LAB
EST. AVERAGE GLUCOSE BLD GHB EST-MCNC: 157 MG/DL (ref 68–126)
HBA1C MFR BLD: 7.1 % (ref ?–5.7)

## 2025-08-09 PROCEDURE — 99214 OFFICE O/P EST MOD 30 MIN: CPT | Performed by: FAMILY MEDICINE

## 2025-08-09 PROCEDURE — G2211 COMPLEX E/M VISIT ADD ON: HCPCS | Performed by: FAMILY MEDICINE

## 2025-08-09 PROCEDURE — 83036 HEMOGLOBIN GLYCOSYLATED A1C: CPT

## 2025-08-09 PROCEDURE — 3061F NEG MICROALBUMINURIA REV: CPT | Performed by: FAMILY MEDICINE

## 2025-08-09 PROCEDURE — 3008F BODY MASS INDEX DOCD: CPT | Performed by: FAMILY MEDICINE

## 2025-08-09 PROCEDURE — 3044F HG A1C LEVEL LT 7.0%: CPT | Performed by: FAMILY MEDICINE

## 2025-08-09 PROCEDURE — 3074F SYST BP LT 130 MM HG: CPT | Performed by: FAMILY MEDICINE

## 2025-08-09 PROCEDURE — 3078F DIAST BP <80 MM HG: CPT | Performed by: FAMILY MEDICINE

## 2025-08-09 PROCEDURE — 36415 COLL VENOUS BLD VENIPUNCTURE: CPT

## (undated) DEVICE — SUTURE CHROMIC GUT 2-0 SH

## (undated) DEVICE — STERILE LATEX POWDER-FREE SURGICAL GLOVESWITH NITRILE COATING: Brand: PROTEXIS

## (undated) DEVICE — SUTURE SILK 3-0 SA64H

## (undated) DEVICE — LAPAROTOMY: Brand: MEDLINE INDUSTRIES, INC.

## (undated) DEVICE — LINE MNTR ADLT SET O2 INTMD

## (undated) DEVICE — BAG DRAIN INFECTION CNTRL 2000

## (undated) DEVICE — SNARE ENDOSCOPIC 10MM ROUND

## (undated) DEVICE — SOL H2O 1000ML BTL

## (undated) DEVICE — SUTURE VICRYL 0 CT-1

## (undated) DEVICE — SUTURE VICRYL 1 CT-1

## (undated) DEVICE — DRAPE SHEET LAPAROTOMY

## (undated) DEVICE — VIOLET BRAIDED (POLYGLACTIN 910), SYNTHETIC ABSORBABLE SUTURE: Brand: COATED VICRYL

## (undated) DEVICE — INSULATED BLADE ELECTRODE 6.5

## (undated) DEVICE — LIGASURE IMPACT OPEN DEVICE

## (undated) DEVICE — SUTURE VICRYL 0 J340H

## (undated) DEVICE — LIGACLIP MCA MULTIPLE CLIP APPLIERS, 20 MEDIUM CLIPS: Brand: LIGACLIP

## (undated) DEVICE — SOL  .9 1000ML BTL

## (undated) DEVICE — KIT ENDO ORCAPOD 160/180/190

## (undated) DEVICE — KIT CLEAN ENDOKIT 1.1OZ GOWNX2

## (undated) DEVICE — 3M™ MEDITPORE™ SOFT CLOTH TAPE 6 IN X 10 YD 12 ROLLS/CASE 2966: Brand: 3M™ MEDIPORE™

## (undated) DEVICE — SNARE OPTMZ PLPCTM TRP

## (undated) DEVICE — MEDI-VAC NON-CONDUCTIVE SUCTION TUBING 6MM X 1.8M (6FT.) L: Brand: CARDINAL HEALTH

## (undated) DEVICE — 3M™ STERI-STRIP™ REINFORCED ADHESIVE SKIN CLOSURES, R1547, 1/2 IN X 4 IN (12 MM X 100 MM), 6 STRIPS/ENVELOPE: Brand: 3M™ STERI-STRIP™

## (undated) NOTE — MR AVS SNAPSHOT
[facility]  [address], [address]  [address] [facility]  [phone]               Thank you for choosing us for your health care visit with Bárbara Carbajal. DO Tien.   We are glad to serve you and happy to provide you with this hodge Referral Orders      Normal Orders This Visit    [address] [facility] ED (INTERNAL) [[id number] CPT(R)]  Order #:  [id number]         Diabetes [facility] [address] [facility]  [address] [place], [place]  Hours:  Monday through Saturday 8am to 4pm by appointment Take 1 tablet (500 mg total) by mouth daily with breakfast.   Commonly known as:  GLUCOPHAGE-XR           WOMENS MULTIVITAMIN PLUS Tabs   Take 1 tablet by mouth daily.                 Where to Get Your Medications      These medications were sent to [address] [place] [address] [address]

## (undated) NOTE — LETTER
7/17/2019              Radha Vance        [address]         Dear Ness Pandya,    This letter is to inform you that our office has made several attempts to reach you by phone without success.   We were attempting to

## (undated) NOTE — LETTER
November 5, 2019     [place] [id number]      Dear Rylee Slight:    Below are the results from your recent visit: Normal mammogram.     Resulted Orders   [facility] GHULAM 2D+3D SCREENING BILAT (CPT=77067/12076)    Lisy Clifford If you have any questions or concerns, please don't hesitate to call.

## (undated) NOTE — MR AVS SNAPSHOT
[facility]  [address] [place] [id number] [phone]               Thank you for choosing us for your health care visit with Km Betancourt.  Yanely Herrera MD.  We are glad to serve you and happy to provide you with this summary of your vis Component Value Standard Range & Units    Pregnancy Test, Urine NEGATIVE     Control Line Present with a clear background (yes/no) YES Yes/No    Kit Lot # [id number] Numeric    Kit Expiration Date 4/10/2018 Date                  Medications Administered in t

## (undated) NOTE — LETTER
February 15, 2018     [address]  [address] [facility] [place] [address]      Dear Ness Pandya:    Below are the results from your recent visit:    Resulted Orders   COMP METABOLIC PANEL (14)   Result Value Ref Range    Glucose 119 (H) 70 - 99 If you have any questions or concerns, please don't hesitate to call. Thelma Chauhan, DO

## (undated) NOTE — MR AVS SNAPSHOT
[facility]  [address], [address]  [address] [facility]  [phone]               Thank you for choosing us for your health care visit with Cira Hall. DO Tien.   We are glad to serve you and happy to provide you with this hodge Order:  Ophthalmology - Internal    [address] [facility]   [address]  [place] [address] [id number]         Referral Orders      Normal Orders This Visit    OPHTHALMOLOGY - INTERNAL [[id number] CUSTOM]  Order #:  [id number]                  **REFERRAL REQ MetFORMIN HCl  MG Tb24   Take 1 tablet (500 mg total) by mouth daily with breakfast.   Commonly known as:  GLUCOPHAGE-XR           Rosuvastatin Calcium 5 MG Tabs   Take 1 tablet (5 mg total) by mouth nightly.    Commonly known as:  CRESTOR

## (undated) NOTE — LETTER
3/20/2017              Octavio Calderón        [address]         Dear Laly Lopez,    It was a pleasure to see you. Your mammogram was normal.  There is no need for further testing at this time.   I look forward to seei

## (undated) NOTE — Clinical Note
2/25/2017              Lalit Nicholas        [address]         Dear Jose Luis Castanon,    Repeat blood sugar was much higher. Your number was 135, previously 125.   Normal range is 70-99.  I would like for you to do additio